# Patient Record
Sex: MALE | Race: WHITE | NOT HISPANIC OR LATINO | URBAN - METROPOLITAN AREA
[De-identification: names, ages, dates, MRNs, and addresses within clinical notes are randomized per-mention and may not be internally consistent; named-entity substitution may affect disease eponyms.]

---

## 2017-01-16 ENCOUNTER — EMERGENCY (EMERGENCY)
Facility: HOSPITAL | Age: 47
LOS: 1 days | Discharge: HOME | End: 2017-01-16
Attending: EMERGENCY MEDICINE | Admitting: EMERGENCY MEDICINE
Payer: OTHER MISCELLANEOUS

## 2017-01-16 VITALS
DIASTOLIC BLOOD PRESSURE: 89 MMHG | HEART RATE: 57 BPM | WEIGHT: 154.98 LBS | RESPIRATION RATE: 18 BRPM | TEMPERATURE: 98 F | SYSTOLIC BLOOD PRESSURE: 149 MMHG | OXYGEN SATURATION: 95 %

## 2017-01-16 VITALS
DIASTOLIC BLOOD PRESSURE: 84 MMHG | HEART RATE: 55 BPM | SYSTOLIC BLOOD PRESSURE: 125 MMHG | OXYGEN SATURATION: 97 % | TEMPERATURE: 98 F | RESPIRATION RATE: 17 BRPM

## 2017-01-16 DIAGNOSIS — S16.1XXA STRAIN OF MUSCLE, FASCIA AND TENDON AT NECK LEVEL, INITIAL ENCOUNTER: ICD-10-CM

## 2017-01-16 DIAGNOSIS — M54.5 LOW BACK PAIN: ICD-10-CM
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
## 2017-01-16 DIAGNOSIS — M25.511 PAIN IN RIGHT SHOULDER: ICD-10-CM

## 2017-01-16 DIAGNOSIS — S43.401A UNSPECIFIED SPRAIN OF RIGHT SHOULDER JOINT, INITIAL ENCOUNTER: ICD-10-CM

## 2017-01-16 PROCEDURE — 73030 X-RAY EXAM OF SHOULDER: CPT

## 2017-01-16 PROCEDURE — 72125 CT NECK SPINE W/O DYE: CPT

## 2017-01-16 PROCEDURE — 72100 X-RAY EXAM L-S SPINE 2/3 VWS: CPT | Mod: 26

## 2017-01-16 PROCEDURE — 99284 EMERGENCY DEPT VISIT MOD MDM: CPT | Mod: 25

## 2017-01-16 PROCEDURE — 99284 EMERGENCY DEPT VISIT MOD MDM: CPT

## 2017-01-16 PROCEDURE — 72100 X-RAY EXAM L-S SPINE 2/3 VWS: CPT

## 2017-01-16 PROCEDURE — 73030 X-RAY EXAM OF SHOULDER: CPT | Mod: 26,RT

## 2017-01-16 PROCEDURE — 72125 CT NECK SPINE W/O DYE: CPT | Mod: 26

## 2017-01-16 RX ORDER — CYCLOBENZAPRINE HYDROCHLORIDE 10 MG/1
1 TABLET, FILM COATED ORAL
Qty: 12 | Refills: 0
Start: 2017-01-16 | End: 2017-01-20

## 2017-01-16 RX ORDER — IBUPROFEN 200 MG
600 TABLET ORAL ONCE
Qty: 0 | Refills: 0 | Status: COMPLETED | OUTPATIENT
Start: 2017-01-16 | End: 2017-01-16

## 2017-01-16 RX ORDER — IBUPROFEN 200 MG
1 TABLET ORAL
Qty: 30 | Refills: 0
Start: 2017-01-16

## 2017-01-16 RX ORDER — CYCLOBENZAPRINE HYDROCHLORIDE 10 MG/1
10 TABLET, FILM COATED ORAL ONCE
Qty: 0 | Refills: 0 | Status: COMPLETED | OUTPATIENT
Start: 2017-01-16 | End: 2017-01-16

## 2017-01-16 RX ADMIN — Medication 600 MILLIGRAM(S): at 09:27

## 2017-01-16 NOTE — ED PROVIDER NOTE - NEUROLOGICAL, MLM
Alert and oriented, no focal deficits, no motor or sensory deficits except for decreased sensation to light touch ulnar right hand

## 2017-01-16 NOTE — ED ADULT NURSE NOTE - OBJECTIVE STATEMENT
Pt to ER states he "tripped and fell on the job," reports falling on face, no obvious snoted, pt denies LOC. Pt reports neck/back pain and tingling to upper extremities began immediately after fall. denies CP/SOB, no nausea/vomiting, no headache/dizziness, no abdominal pain. Patient able to move all extremities, no numbness/tingling to lower extremities noted. Remains in neck brace placed by EMS. await further medical evaluation safety precautions maintained, will continue to monitor.

## 2017-01-16 NOTE — ED PROVIDER NOTE - ENMT, MLM
Airway patent, Nasal mucosa clear. Mouth with normal mucosa.  Mid cervical midline tenderness and paraspinal tenderness more on right

## 2017-01-16 NOTE — ED ADULT TRIAGE NOTE - CHIEF COMPLAINT QUOTE
pateint s/p trip and fall from standing height. he states that he hit his face anf right arm. patient complains of neck stiffness with tingling to upper extremities. patient was placed in C-collar by EMS PTA

## 2017-01-16 NOTE — ED PROVIDER NOTE - DIAGNOSTIC INTERPRETATION
lumbar xray: no fracture or dislocation read by Dr. Borja  shoulder xray: no fracture or dislocation read by Dr. Borja

## 2017-01-16 NOTE — ED PROVIDER NOTE - MUSCULOSKELETAL, MLM
Spine appears normal, c spine tenderness mid cervical, tenderness right lateral shoulder with pain with attempted abduction of shoulder, left lumbar paraspinal tenderness

## 2017-01-16 NOTE — ED PROVIDER NOTE - MEDICAL DECISION MAKING DETAILS
mechanical fall.  xrays and neg for fracture.  pain improved with percocet/motrin/flexeril.  suspect strain/contusion.  plan shoulder sling, ortho followup, prn meds

## 2017-01-16 NOTE — ED PROVIDER NOTE - OBJECTIVE STATEMENT
here with pain in right shoulder, neck, left lower back after trip and fall.  Fell and hit upper back/neck on curb.  No loc.  Notes pins/needles sensation in arms and numbness in lateral right arm.  No chest pain, weakness, sob.

## 2017-01-16 NOTE — ED ADULT NURSE NOTE - ADDITIONAL PRINTED INSTRUCTIONS GIVEN
D/c w/ instructions for followup, pt verbalized understanding of all instructions, pt to followup w/ PMD/Ortho.

## 2019-09-04 ENCOUNTER — EMERGENCY (EMERGENCY)
Facility: HOSPITAL | Age: 49
LOS: 0 days | Discharge: HOME | End: 2019-09-04
Attending: EMERGENCY MEDICINE | Admitting: EMERGENCY MEDICINE
Payer: COMMERCIAL

## 2019-09-04 VITALS
OXYGEN SATURATION: 97 % | WEIGHT: 164.91 LBS | TEMPERATURE: 98 F | HEART RATE: 53 BPM | SYSTOLIC BLOOD PRESSURE: 132 MMHG | RESPIRATION RATE: 18 BRPM | DIASTOLIC BLOOD PRESSURE: 95 MMHG

## 2019-09-04 DIAGNOSIS — R07.9 CHEST PAIN, UNSPECIFIED: ICD-10-CM

## 2019-09-04 PROCEDURE — 93010 ELECTROCARDIOGRAM REPORT: CPT

## 2019-09-04 NOTE — ED PROVIDER NOTE - CLINICAL SUMMARY MEDICAL DECISION MAKING FREE TEXT BOX
48 yo male presented to triage complaining of CP. Triage RN did ekg. That ekg reviewed. Pt left ER prior to being seen by ER RN, PA or MD.

## 2019-09-26 ENCOUNTER — EMERGENCY (EMERGENCY)
Facility: HOSPITAL | Age: 49
LOS: 0 days | Discharge: HOME | End: 2019-09-26
Attending: EMERGENCY MEDICINE | Admitting: EMERGENCY MEDICINE
Payer: COMMERCIAL

## 2019-09-26 VITALS
HEART RATE: 61 BPM | SYSTOLIC BLOOD PRESSURE: 149 MMHG | DIASTOLIC BLOOD PRESSURE: 88 MMHG | RESPIRATION RATE: 19 BRPM | TEMPERATURE: 98 F | OXYGEN SATURATION: 97 %

## 2019-09-26 VITALS — HEIGHT: 65 IN | WEIGHT: 160.06 LBS

## 2019-09-26 DIAGNOSIS — R09.89 OTHER SPECIFIED SYMPTOMS AND SIGNS INVOLVING THE CIRCULATORY AND RESPIRATORY SYSTEMS: ICD-10-CM

## 2019-09-26 DIAGNOSIS — Z98.890 OTHER SPECIFIED POSTPROCEDURAL STATES: ICD-10-CM

## 2019-09-26 DIAGNOSIS — Z98.890 OTHER SPECIFIED POSTPROCEDURAL STATES: Chronic | ICD-10-CM

## 2019-09-26 DIAGNOSIS — R07.89 OTHER CHEST PAIN: ICD-10-CM

## 2019-09-26 LAB
ALBUMIN SERPL ELPH-MCNC: 4.1 G/DL — SIGNIFICANT CHANGE UP (ref 3.5–5.2)
ALP SERPL-CCNC: 86 U/L — SIGNIFICANT CHANGE UP (ref 30–115)
ALT FLD-CCNC: 36 U/L — SIGNIFICANT CHANGE UP (ref 0–41)
ANION GAP SERPL CALC-SCNC: 8 MMOL/L — SIGNIFICANT CHANGE UP (ref 7–14)
AST SERPL-CCNC: 27 U/L — SIGNIFICANT CHANGE UP (ref 0–41)
BILIRUB SERPL-MCNC: 0.3 MG/DL — SIGNIFICANT CHANGE UP (ref 0.2–1.2)
BUN SERPL-MCNC: 33 MG/DL — HIGH (ref 10–20)
CALCIUM SERPL-MCNC: 9 MG/DL — SIGNIFICANT CHANGE UP (ref 8.5–10.1)
CHLORIDE SERPL-SCNC: 104 MMOL/L — SIGNIFICANT CHANGE UP (ref 98–110)
CO2 SERPL-SCNC: 27 MMOL/L — SIGNIFICANT CHANGE UP (ref 17–32)
CREAT SERPL-MCNC: 1 MG/DL — SIGNIFICANT CHANGE UP (ref 0.7–1.5)
GLUCOSE SERPL-MCNC: 102 MG/DL — HIGH (ref 70–99)
HCT VFR BLD CALC: 43.9 % — SIGNIFICANT CHANGE UP (ref 42–52)
HGB BLD-MCNC: 14 G/DL — SIGNIFICANT CHANGE UP (ref 14–18)
LIDOCAIN IGE QN: 57 U/L — SIGNIFICANT CHANGE UP (ref 7–60)
MCHC RBC-ENTMCNC: 27 PG — SIGNIFICANT CHANGE UP (ref 27–31)
MCHC RBC-ENTMCNC: 31.9 G/DL — LOW (ref 32–37)
MCV RBC AUTO: 84.7 FL — SIGNIFICANT CHANGE UP (ref 80–94)
NRBC # BLD: 0 /100 WBCS — SIGNIFICANT CHANGE UP (ref 0–0)
PLATELET # BLD AUTO: 171 K/UL — SIGNIFICANT CHANGE UP (ref 130–400)
POTASSIUM SERPL-MCNC: 4.1 MMOL/L — SIGNIFICANT CHANGE UP (ref 3.5–5)
POTASSIUM SERPL-SCNC: 4.1 MMOL/L — SIGNIFICANT CHANGE UP (ref 3.5–5)
PROT SERPL-MCNC: 6.5 G/DL — SIGNIFICANT CHANGE UP (ref 6–8)
RBC # BLD: 5.18 M/UL — SIGNIFICANT CHANGE UP (ref 4.7–6.1)
RBC # FLD: 12.1 % — SIGNIFICANT CHANGE UP (ref 11.5–14.5)
SODIUM SERPL-SCNC: 139 MMOL/L — SIGNIFICANT CHANGE UP (ref 135–146)
TROPONIN T SERPL-MCNC: <0.01 NG/ML — SIGNIFICANT CHANGE UP
WBC # BLD: 4.67 K/UL — LOW (ref 4.8–10.8)
WBC # FLD AUTO: 4.67 K/UL — LOW (ref 4.8–10.8)

## 2019-09-26 PROCEDURE — 71046 X-RAY EXAM CHEST 2 VIEWS: CPT | Mod: 26

## 2019-09-26 PROCEDURE — 99285 EMERGENCY DEPT VISIT HI MDM: CPT

## 2019-09-26 RX ORDER — ALBUTEROL 90 UG/1
1 AEROSOL, METERED ORAL ONCE
Refills: 0 | Status: COMPLETED | OUTPATIENT
Start: 2019-09-26 | End: 2019-09-26

## 2019-09-26 RX ADMIN — ALBUTEROL 1 PUFF(S): 90 AEROSOL, METERED ORAL at 05:52

## 2019-09-26 NOTE — ED PROVIDER NOTE - CARE PROVIDER_API CALL
Roel Malone)  Cardiovascular Disease; Internal Medicine  [address] [place] [id number]  Phone: [phone]  Fax: [phone]  Follow Up Time:

## 2019-09-26 NOTE — ED PROVIDER NOTE - CLINICAL SUMMARY MEDICAL DECISION MAKING FREE TEXT BOX
pt pw  nasal/sinus congestion that he feels trackingto his chest -  low heart score , ekg no ischemi a cxr not wide no ptx , trop negative - Patient to be discharged from ED. Any available test results were discussed with and printed  for patient.  Verbal instructions given, including instructions to return to ED immediately for any new, worsening, or concerning symptoms. Patient reports understanding of above with capacity and insight. Written discharge instructions additionally given, including follow-up plan.

## 2019-09-26 NOTE — ED PROVIDER NOTE - OBJECTIVE STATEMENT
50 yo Male no sig hx present c/o throat and chest tightness started an hour ago woke him up from sleep. reported he has had seasonal allergy symptoms over the past week. Took Afrin and albuterol nebs which seems improved his symptoms. denies sharp chest pain/sob/diaphoresis/weakness assoc with the chest tightness. Denies exogenous hormone use/recent hospitalization/hx of DVT. denies recent illness/fever/chill/HA/dizziness/sob/abd pain/n/v/d/urinary sxs.

## 2019-09-26 NOTE — ED PROVIDER NOTE - NS ED ROS FT
Constitutional: no fever, chills, no recent weight loss, change in appetite or malaise  Eyes: no redness/discharge/pain/vision changes  ENT: + rhinorrhea no ear pain/sore throat  Cardiac: see HPI  Respiratory: No cough or respiratory distress  GI: No nausea, vomiting, diarrhea or abdominal pain.  : No dysuria, frequency, urgency or hematuria  MS: no pain to back or extremities, no loss of ROM, no weakness  Neuro: No headache or weakness. No LOC.  Skin: No skin rash.  Endocrine: No history of thyroid disease or diabetes.  Except as documented in the HPI, all other systems are negative.

## 2019-09-26 NOTE — ED PROVIDER NOTE - PHYSICAL EXAMINATION
CONSTITUTIONAL: Well-appearing; well-nourished; in no apparent distress.   EYES: PERRL; EOM intact.   CARDIOVASCULAR: Normal S1, S2; no murmurs, rubs, or gallops.   RESPIRATORY: Normal chest excursion with respiration; breath sounds clear and equal bilaterally; no wheezes, rhonchi, or rales.  GI/: Normal bowel sounds; non-distended; non-tender; no palpable organomegaly.   MS: No calf swelling and tenderness.  SKIN: Normal for age and race; warm; dry; good turgor; no apparent lesions or exudate.   NEURO/PSYCH: A & O x 4; grossly unremarkable.

## 2019-09-26 NOTE — ED PROVIDER NOTE - PATIENT PORTAL LINK FT
You can access the FollowMyHealth Patient Portal offered by [facility] by registering at the following website: [email or web address]. By joining [facility]’s FollowMyHealth portal, you will also be able to view your health information using other applications (apps) compatible with our system.

## 2020-09-09 ENCOUNTER — INPATIENT (INPATIENT)
Facility: HOSPITAL | Age: 50
LOS: 5 days | Discharge: HOME HEALTH | End: 2020-09-15
Attending: THORACIC SURGERY (CARDIOTHORACIC VASCULAR SURGERY) | Admitting: THORACIC SURGERY (CARDIOTHORACIC VASCULAR SURGERY)
Payer: COMMERCIAL

## 2020-09-09 VITALS
OXYGEN SATURATION: 96 % | HEART RATE: 60 BPM | SYSTOLIC BLOOD PRESSURE: 180 MMHG | TEMPERATURE: 98 F | DIASTOLIC BLOOD PRESSURE: 102 MMHG | RESPIRATION RATE: 18 BRPM

## 2020-09-09 DIAGNOSIS — Z98.890 OTHER SPECIFIED POSTPROCEDURAL STATES: Chronic | ICD-10-CM

## 2020-09-09 DIAGNOSIS — I21.4 NON-ST ELEVATION (NSTEMI) MYOCARDIAL INFARCTION: ICD-10-CM

## 2020-09-09 DIAGNOSIS — Z87.891 PERSONAL HISTORY OF NICOTINE DEPENDENCE: ICD-10-CM

## 2020-09-09 DIAGNOSIS — I10 ESSENTIAL (PRIMARY) HYPERTENSION: ICD-10-CM

## 2020-09-09 DIAGNOSIS — I25.10 ATHEROSCLEROTIC HEART DISEASE OF NATIVE CORONARY ARTERY WITHOUT ANGINA PECTORIS: ICD-10-CM

## 2020-09-09 DIAGNOSIS — Z82.49 FAMILY HISTORY OF ISCHEMIC HEART DISEASE AND OTHER DISEASES OF THE CIRCULATORY SYSTEM: ICD-10-CM

## 2020-09-09 DIAGNOSIS — R73.9 HYPERGLYCEMIA, UNSPECIFIED: ICD-10-CM

## 2020-09-09 DIAGNOSIS — J93.9 PNEUMOTHORAX, UNSPECIFIED: ICD-10-CM

## 2020-09-09 DIAGNOSIS — R14.0 ABDOMINAL DISTENSION (GASEOUS): ICD-10-CM

## 2020-09-09 DIAGNOSIS — R09.02 HYPOXEMIA: ICD-10-CM

## 2020-09-09 DIAGNOSIS — G89.12 ACUTE POST-THORACOTOMY PAIN: ICD-10-CM

## 2020-09-09 DIAGNOSIS — D62 ACUTE POSTHEMORRHAGIC ANEMIA: ICD-10-CM

## 2020-09-09 LAB
ALBUMIN SERPL ELPH-MCNC: 4.6 G/DL — SIGNIFICANT CHANGE UP (ref 3.5–5.2)
ALP SERPL-CCNC: 86 U/L — SIGNIFICANT CHANGE UP (ref 30–115)
ALT FLD-CCNC: 30 U/L — SIGNIFICANT CHANGE UP (ref 0–41)
ANION GAP SERPL CALC-SCNC: 11 MMOL/L — SIGNIFICANT CHANGE UP (ref 7–14)
APTT BLD: 28.1 SEC — SIGNIFICANT CHANGE UP (ref 27–39.2)
AST SERPL-CCNC: 25 U/L — SIGNIFICANT CHANGE UP (ref 0–41)
BASOPHILS # BLD AUTO: 0.04 K/UL — SIGNIFICANT CHANGE UP (ref 0–0.2)
BASOPHILS NFR BLD AUTO: 0.8 % — SIGNIFICANT CHANGE UP (ref 0–1)
BILIRUB SERPL-MCNC: 0.2 MG/DL — SIGNIFICANT CHANGE UP (ref 0.2–1.2)
BUN SERPL-MCNC: 35 MG/DL — HIGH (ref 10–20)
CALCIUM SERPL-MCNC: 9.3 MG/DL — SIGNIFICANT CHANGE UP (ref 8.5–10.1)
CHLORIDE SERPL-SCNC: 102 MMOL/L — SIGNIFICANT CHANGE UP (ref 98–110)
CO2 SERPL-SCNC: 25 MMOL/L — SIGNIFICANT CHANGE UP (ref 17–32)
CREAT SERPL-MCNC: 1.1 MG/DL — SIGNIFICANT CHANGE UP (ref 0.7–1.5)
EOSINOPHIL # BLD AUTO: 0.12 K/UL — SIGNIFICANT CHANGE UP (ref 0–0.7)
EOSINOPHIL NFR BLD AUTO: 2.5 % — SIGNIFICANT CHANGE UP (ref 0–8)
GLUCOSE BLDC GLUCOMTR-MCNC: 90 MG/DL — SIGNIFICANT CHANGE UP (ref 70–99)
GLUCOSE SERPL-MCNC: 108 MG/DL — HIGH (ref 70–99)
HCT VFR BLD CALC: 47.3 % — SIGNIFICANT CHANGE UP (ref 42–52)
HGB BLD-MCNC: 15 G/DL — SIGNIFICANT CHANGE UP (ref 14–18)
IMM GRANULOCYTES NFR BLD AUTO: 0.4 % — HIGH (ref 0.1–0.3)
INR BLD: 1.08 RATIO — SIGNIFICANT CHANGE UP (ref 0.65–1.3)
LYMPHOCYTES # BLD AUTO: 1.78 K/UL — SIGNIFICANT CHANGE UP (ref 1.2–3.4)
LYMPHOCYTES # BLD AUTO: 36.4 % — SIGNIFICANT CHANGE UP (ref 20.5–51.1)
MAGNESIUM SERPL-MCNC: 1.9 MG/DL — SIGNIFICANT CHANGE UP (ref 1.8–2.4)
MCHC RBC-ENTMCNC: 27.4 PG — SIGNIFICANT CHANGE UP (ref 27–31)
MCHC RBC-ENTMCNC: 31.7 G/DL — LOW (ref 32–37)
MCV RBC AUTO: 86.5 FL — SIGNIFICANT CHANGE UP (ref 80–94)
MONOCYTES # BLD AUTO: 0.68 K/UL — HIGH (ref 0.1–0.6)
MONOCYTES NFR BLD AUTO: 13.9 % — HIGH (ref 1.7–9.3)
NEUTROPHILS # BLD AUTO: 2.25 K/UL — SIGNIFICANT CHANGE UP (ref 1.4–6.5)
NEUTROPHILS NFR BLD AUTO: 46 % — SIGNIFICANT CHANGE UP (ref 42.2–75.2)
NRBC # BLD: 0 /100 WBCS — SIGNIFICANT CHANGE UP (ref 0–0)
NT-PROBNP SERPL-SCNC: 47 PG/ML — SIGNIFICANT CHANGE UP (ref 0–300)
PLATELET # BLD AUTO: 168 K/UL — SIGNIFICANT CHANGE UP (ref 130–400)
POTASSIUM SERPL-MCNC: 4.4 MMOL/L — SIGNIFICANT CHANGE UP (ref 3.5–5)
POTASSIUM SERPL-SCNC: 4.4 MMOL/L — SIGNIFICANT CHANGE UP (ref 3.5–5)
PROT SERPL-MCNC: 7 G/DL — SIGNIFICANT CHANGE UP (ref 6–8)
PROTHROM AB SERPL-ACNC: 12.4 SEC — SIGNIFICANT CHANGE UP (ref 9.95–12.87)
RAPID RVP RESULT: SIGNIFICANT CHANGE UP
RBC # BLD: 5.47 M/UL — SIGNIFICANT CHANGE UP (ref 4.7–6.1)
RBC # FLD: 12.3 % — SIGNIFICANT CHANGE UP (ref 11.5–14.5)
SARS-COV-2 RNA SPEC QL NAA+PROBE: SIGNIFICANT CHANGE UP
SODIUM SERPL-SCNC: 138 MMOL/L — SIGNIFICANT CHANGE UP (ref 135–146)
TROPONIN T SERPL-MCNC: <0.01 NG/ML — SIGNIFICANT CHANGE UP
WBC # BLD: 4.89 K/UL — SIGNIFICANT CHANGE UP (ref 4.8–10.8)
WBC # FLD AUTO: 4.89 K/UL — SIGNIFICANT CHANGE UP (ref 4.8–10.8)

## 2020-09-09 PROCEDURE — 93308 TTE F-UP OR LMTD: CPT | Mod: 26

## 2020-09-09 PROCEDURE — 99291 CRITICAL CARE FIRST HOUR: CPT | Mod: 25

## 2020-09-09 PROCEDURE — 93458 L HRT ARTERY/VENTRICLE ANGIO: CPT | Mod: 26

## 2020-09-09 PROCEDURE — 71045 X-RAY EXAM CHEST 1 VIEW: CPT | Mod: 26

## 2020-09-09 PROCEDURE — 93010 ELECTROCARDIOGRAM REPORT: CPT

## 2020-09-09 PROCEDURE — 99222 1ST HOSP IP/OBS MODERATE 55: CPT | Mod: 57

## 2020-09-09 RX ORDER — HEPARIN SODIUM 5000 [USP'U]/ML
INJECTION INTRAVENOUS; SUBCUTANEOUS
Qty: 25000 | Refills: 0 | Status: DISCONTINUED | OUTPATIENT
Start: 2020-09-09 | End: 2020-09-09

## 2020-09-09 RX ORDER — SODIUM CHLORIDE 9 MG/ML
1000 INJECTION INTRAMUSCULAR; INTRAVENOUS; SUBCUTANEOUS
Refills: 0 | Status: DISCONTINUED | OUTPATIENT
Start: 2020-09-09 | End: 2020-09-11

## 2020-09-09 RX ORDER — HEPARIN SODIUM 5000 [USP'U]/ML
800 INJECTION INTRAVENOUS; SUBCUTANEOUS
Qty: 25000 | Refills: 0 | Status: DISCONTINUED | OUTPATIENT
Start: 2020-09-09 | End: 2020-09-11

## 2020-09-09 RX ORDER — NITROGLYCERIN 6.5 MG
10 CAPSULE, EXTENDED RELEASE ORAL
Qty: 50 | Refills: 0 | Status: DISCONTINUED | OUTPATIENT
Start: 2020-09-09 | End: 2020-09-11

## 2020-09-09 RX ORDER — CHLORHEXIDINE GLUCONATE 213 G/1000ML
1 SOLUTION TOPICAL
Refills: 0 | Status: DISCONTINUED | OUTPATIENT
Start: 2020-09-09 | End: 2020-09-11

## 2020-09-09 RX ORDER — NITROGLYCERIN 6.5 MG
0.4 CAPSULE, EXTENDED RELEASE ORAL ONCE
Refills: 0 | Status: COMPLETED | OUTPATIENT
Start: 2020-09-09 | End: 2020-09-09

## 2020-09-09 RX ORDER — INFLUENZA VIRUS VACCINE 15; 15; 15; 15 UG/.5ML; UG/.5ML; UG/.5ML; UG/.5ML
0.5 SUSPENSION INTRAMUSCULAR ONCE
Refills: 0 | Status: DISCONTINUED | OUTPATIENT
Start: 2020-09-09 | End: 2020-09-11

## 2020-09-09 RX ORDER — ASPIRIN/CALCIUM CARB/MAGNESIUM 324 MG
81 TABLET ORAL DAILY
Refills: 0 | Status: DISCONTINUED | OUTPATIENT
Start: 2020-09-09 | End: 2020-09-11

## 2020-09-09 RX ADMIN — SODIUM CHLORIDE 100 MILLILITER(S): 9 INJECTION INTRAMUSCULAR; INTRAVENOUS; SUBCUTANEOUS at 22:30

## 2020-09-09 RX ADMIN — Medication 81 MILLIGRAM(S): at 18:26

## 2020-09-09 RX ADMIN — Medication 0.4 MILLIGRAM(S): at 18:50

## 2020-09-09 NOTE — CHART NOTE - NSCHARTNOTEFT_GEN_A_CORE
PRE-OP DIAGNOSIS: NSTEMI    PROCEDURE: [facility] with coronary angiography    Physician: Dr German  Assistant: Dr. Gaston Anthony    ANESTHESIA TYPE:  [  ]General Anesthesia  [ x ] Sedation  [ x ] Local/Regional    ESTIMATED BLOOD LOSS:    10   mL    CONDITION  [  ] Critical  [  ] Serious  [  ]Fair  [ x ]Good    IV CONTRAST:    100         mL    FINDINGS  Left Heart Catheterization:  LVEDP: 18    [x ]  significant 3-vessel coronary artery disease     ACCESS:    [x ] right radial artery  [ ] right femoral artery    LEFT HEART CATHETERIZATION                                    The coronary circulation is right dominant.   Left main: minor luminal irregularities  LAD: diffuse 90 % stenosis prox lad.   D1: diffuse 90 % stenosis at the ostium.  diffuse 99 % stenosis in the proximal third of the vessel segment - likely culprit for the patient's clinical presentation.   Circumflex: 60 % stenosis at the ostium. 80 % stenosis distal lcx.   OM1: 90 % stenosis at the ostium  OM3: 50 % stenosis in the proximal third  RCA: moderate diffuse atherosclerosis   RPDA: 90 % stenosis at the ostium   Right posterolateral segment: moderate diffuse atherosclerosis      INTERVENTION  IMPLANTS: none    POST-OP DIAGNOSIS  SIGNIFICANT 3VD        PLAN OF CARE  - admit to CCU --> txf to CTU once ct sx reviews/approves  - start on asa, lipitor 80  - start on hep gtt without bolus  - check cbc, cmp, lipid panel, hg a1c   - ekg upon arrival to floor and in am  - if more pain overnight,  if unstable, contact cardio fellow immediately,  may need urgent CABG by CT sx (Dr. Salgado) PRE-OP DIAGNOSIS: NSTEMI    PROCEDURE: [facility] with coronary angiography    Physician: Dr German  Assistant: Dr. Gaston Anthony    ANESTHESIA TYPE:  [  ]General Anesthesia  [ x ] Sedation  [ x ] Local/Regional    ESTIMATED BLOOD LOSS:    10   mL    CONDITION  [  ] Critical  [  ] Serious  [  ]Fair  [ x ]Good    IV CONTRAST:    100         mL    FINDINGS  Left Heart Catheterization:  LVEDP: 18    [x ]  significant 3-vessel coronary artery disease     ACCESS:    [x ] right radial artery  [ ] right femoral artery    LEFT HEART CATHETERIZATION                                    The coronary circulation is right dominant.   Left main: minor luminal irregularities  LAD: diffuse 90 % stenosis prox lad.   D1: diffuse 90 % stenosis at the ostium.  diffuse 99 % stenosis in the proximal third of the vessel segment - likely culprit for the patient's clinical presentation.   Circumflex: 60 % stenosis at the ostium. 80 % stenosis distal lcx.   OM1: 90 % stenosis at the ostium  OM3: 50 % stenosis in the proximal third  RCA: moderate diffuse atherosclerosis   RPDA: 90 % stenosis at the ostium   Right posterolateral segment: moderate diffuse atherosclerosis      INTERVENTION  IMPLANTS: none    POST-OP DIAGNOSIS  SIGNIFICANT 3VD        PLAN OF CARE  - admit to CCU --> txf to CTU once ct sx reviews/approves  - start on asa, lipitor 80  - start on hep gtt without bolus  - nitro gtt, start at 10, titrate to cp free  - check cbc, cmp, lipid panel, hg a1c   - ekg upon arrival to floor and in am  - if more pain overnight,  if unstable, contact cardio fellow immediately,  may need urgent CABG by CT sx (Dr. Salgado) PRE-OP DIAGNOSIS: NSTEMI    PROCEDURE: [facility] with coronary angiography    Physician: Dr German  Assistant: Dr. Gaston Anthony    ANESTHESIA TYPE:  [  ]General Anesthesia  [ x ] Sedation  [ x ] Local/Regional    ESTIMATED BLOOD LOSS:    10   mL    CONDITION  [  ] Critical  [  ] Serious  [  ]Fair  [ x ]Good    IV CONTRAST:    100         mL    FINDINGS  Left Heart Catheterization:  LVEDP: 18    [x ]  significant 3-vessel coronary artery disease     ACCESS:    [x ] right radial artery  [ ] right femoral artery    LEFT HEART CATHETERIZATION                                    The coronary circulation is right dominant.   Left main: minor luminal irregularities  LAD: diffuse 90 % stenosis prox lad.   D1: diffuse 90 % stenosis at the ostium.  diffuse 99 % stenosis in the proximal third of the vessel segment - likely culprit for the patient's clinical presentation.   Circumflex: 60 % stenosis at the ostium. 80 % stenosis distal lcx.   OM1: 90 % stenosis at the ostium  OM3: 50 % stenosis in the proximal third  RCA: moderate diffuse atherosclerosis   RPDA: 90 % stenosis at the ostium   Right posterolateral segment: moderate diffuse atherosclerosis      INTERVENTION  IMPLANTS: none    POST-OP DIAGNOSIS  SIGNIFICANT 3VD        PLAN OF CARE  - admit to CCU --> txf to CTU once ct sx reviews/approves  - start on asa, lipitor 80  - start on hep gtt without bolus  - nitro gtt, start at 10, titrate to cp free  - check cbc, cmp, lipid panel, hg a1c   - ekg upon arrival to floor and in am  - if more pain overnight,  if unstable, contact cardio fellow immediately,  may need urgent CABG by CT sx

## 2020-09-09 NOTE — PATIENT PROFILE ADULT - NSPROEDALEARNPREF_GEN_A_NUR
written material/verbal instruction/video/audio/group instruction/computer/internet/skill demonstration/individual instruction

## 2020-09-09 NOTE — ED ADULT NURSE REASSESSMENT NOTE - NS ED NURSE REASSESS COMMENT FT1
pt transferred to cardiac cath lab, accompanied by RN and cardiology team. continuous cardiac monitoring in place

## 2020-09-09 NOTE — ED PROVIDER NOTE - NSFOLLOWUPINSTRUCTIONS_ED_ALL_ED_FT
Last seen: 12/05/2018  pt  Last filled:11/26/2018 Fentanyl Patch  Upcoming apt: 04/29/2019       50M p/w chest pain. persistent. concerning ekg for diffuse depressions. repeat ekg with no change. given asa/ntg.

## 2020-09-09 NOTE — ED PROVIDER NOTE - PROGRESS NOTE DETAILS
Cardiology fellow called, will come evaluate patient in ED STAT cardio fellow at bedside- bedside cardiac us- no effusion. good squeeze - unable to asses lateral wall SC: PT with ST depressions in anterior leads, consistent with posterior MI. PT cardiology to take patient to cath lab. Patient to be admitted to a coronary critical care unit.  Case discussed with on-call cardiologist. Care endorsed to ICU resident.

## 2020-09-09 NOTE — ED PROVIDER NOTE - PHYSICAL EXAMINATION
CONSTITUTIONAL: Well-appearing; well-nourished; in mild distress due to chest pain  HEAD: Normocephalic; atraumatic.   EYES: PERRL; EOM intact. Conjunctiva normal B/L.   NECK: no jvd  CHEST: Normal chest excursion with respiration.   CARDIOVASCULAR: Normal S1, S2; no murmurs, rubs, or gallops.   RESPIRATORY: Normal chest excursion with respiration; breath sounds clear and equal bilaterally; no wheezes, rhonchi, or rales.  GI/: Normal bowel sounds; non-distended; non-tender.  EXT: warm, no edema. 2+ distal pulses throughout   SKIN: Normal for age and race; warm; dry; good turgor.  NEURO: A & O x 3; CN 2-12 intact. Grossly unremarkable.

## 2020-09-09 NOTE — ED ADULT NURSE NOTE - OBJECTIVE STATEMENT
Pt. came to ED c/o chest pain 5/10 in severity that radiates to left arm that started 1 hour ago. Denies sob, n/v/d, chills, or fever.

## 2020-09-09 NOTE — ED PROVIDER NOTE - OBJECTIVE STATEMENT
Patient is a 49 y/o male w/ no known pmhx, strong family hx of heart disease, who presents complaining of chest pain. Patient was exercising on treadmill when he developed sudden onset chest pressure/burning radiating to his left arm. Stopped exercising and chest pain persisted so came to ED. Onset of chest pain 1 hour prior to ED presentation. No hx of chest pain on exertion prior to today. Denies shortness of breath, syncope, abdominal pain, nausea/vomiting, swelling of legs, or any other complaints.

## 2020-09-09 NOTE — ED ADULT NURSE NOTE - NSIMPLEMENTINTERV_GEN_ALL_ED
Implemented All Universal Safety Interventions:  [place] to call system. Call bell, personal items and telephone within reach. Instruct patient to call for assistance. Room bathroom lighting operational. Non-slip footwear when patient is off stretcher. Physically safe environment: no spills, clutter or unnecessary equipment. Stretcher in lowest position, wheels locked, appropriate side rails in place.

## 2020-09-09 NOTE — CONSULT NOTE ADULT - ASSESSMENT
IMPRESSION  - NSTE-ACS  (s/p cath:  significant dz LAD, D1, LCX, OM1,RPDA)  - Significant 3V CAD    PLAN  - admit to CCU --> txf to CTU once ct sx reviews/approves  - start on asa, lipitor 80  - start on hep gtt without bolus  - check cbc, cmp, lipid panel, hg a1c   - ekg upon arrival to floor and in am  - if more pain overnight,  if unstable, contact cardio fellow immediately,  may need urgent CABG by CT sx (Dr. Salgado). IMPRESSION  - NSTE-ACS  (s/p cath:  significant dz LAD, D1, LCX, OM1,RPDA)  - Significant 3V CAD    PLAN  - admit to CCU --> txf to CTU once ct sx reviews/approves  - start on asa, lipitor 80  - start on hep gtt without bolus  - check cbc, cmp, lipid panel, hg a1c   - ekg upon arrival to floor and in am  - check tte  - if more pain overnight,  if unstable, contact cardio fellow immediately,  may need urgent CABG by CT sx (Dr. Salgado).

## 2020-09-09 NOTE — ED PROVIDER NOTE - CRITICAL CARE PROVIDED
consult w/ pt's family directly relating to pts condition/additional history taking/documentation/direct patient care (not related to procedure)/consultation with other physicians

## 2020-09-09 NOTE — CONSULT NOTE ADULT - SUBJECTIVE AND OBJECTIVE BOX
HPI:  51 yo M,  no sig pmh,  FH of early CAD in mother (60s), father (age?), nonsmoker --  presents with L chest pain radiating down to L arm.   Started while working out ~1.5h ptp, ongoing pains,  a/w nausea, sob.      Never had pains like this in past.  Was able to work out without cp previously.      EKG showing evolving ischemic changes concerning for possible posterior MI.  Pt taken urgently to cath due to evolving ekg changes and persistent pains.     Cath showed signfiicant 3VD (see cath report for full details),  and CT surgery consulted re: CABG.      PAST MEDICAL & SURGICAL HISTORY  No pertinent past medical history  H/O shoulder surgery        FAMILY HISTORY:  FAMILY HISTORY:  Family history of coronary artery disease (Mother)      SOCIAL HISTORY:  []smoker - denied  []Alcohol  []Drug    ALLERGIES:  No Known Allergies      MEDICATIONS:  aspirin  chewable 81 milliGRAM(s) Oral daily  heparin  Infusion.  Unit(s)/Hr (8 mL/Hr) IV Continuous <Continuous>  nitroglycerin  Infusion 10 MICROgram(s)/Min (3 mL/Hr) IV Continuous <Continuous>  sodium chloride 0.9%. 1000 milliLiter(s) (100 mL/Hr) IV Continuous <Continuous>      HOME MEDICATIONS:  Home Medications:  none    VITALS:   T(F): 98.4 (09-09 @ 21:06), Max: 98.5 (09-09 @ 18:10)  HR: 64 (09-09 @ 22:00) (60 - 72)  BP: 115/70 (09-09 @ 21:45) (106/57 - 180/102)  BP(mean): 81 (09-09 @ 22:00) (81 - 92)  RR: 18 (09-09 @ 22:00) (13 - 20)  SpO2: 98% (09-09 @ 22:00) (96% - 99%)    I&O's Summary    09 Sep 2020 07:01  -  09 Sep 2020 22:10  --------------------------------------------------------  IN: 0 mL / OUT: 400 mL / NET: -400 mL        REVIEW OF SYSTEMS:  CONSTITUTIONAL: No weakness, fevers or chills  HEENT: No visual changes, neck/ear pain  RESPIRATORY: No cough, +sob  CARDIOVASCULAR: +CP  GASTROINTESTINAL: No abdominal pain. +nausea  GENITOURINARY: No dysuria, frequency or hematuria  NEUROLOGICAL: No new focal deficits  SKIN: No new rashes    PHYSICAL EXAM:  General: Not in distress.  Non-toxic appearing.   HEENT: EOMI  Cardio: Regular rate and rhythm, S1, S2, no murmur  Pulm: B/L BS.  No wheezing / crackles  Abdomen: Soft, non-tender, non-distended. Normoactive bowel sounds  Extremities: No edema b/l   Neuro: A&O x3. No focal deficits    LABS:                        15.0   4.89  )-----------( 168      ( 09 Sep 2020 18:20 )             47.3     09-09    138  |  102  |  35<H>  ----------------------------<  108<H>  4.4   |  25  |  1.1    Ca    9.3      09 Sep 2020 18:20  Mg     1.9     09-09    TPro  7.0  /  Alb  4.6  /  TBili  0.2  /  DBili  x   /  AST  25  /  ALT  30  /  AlkPhos  86  09-09    PT/INR - ( 09 Sep 2020 18:20 )   PT: 12.40 sec;   INR: 1.08 ratio         PTT - ( 09 Sep 2020 18:20 )  PTT:28.1 sec  Troponin T, Serum: <0.01 ng/mL (09-09-20 @ 18:20)    CARDIAC MARKERS ( 09 Sep 2020 18:20 )  x     / <0.01 ng/mL / x     / x     / x            Troponin trend:    Serum Pro-Brain Natriuretic Peptide: 47 pg/mL (09-09-20 @ 18:20)          RADIOLOGY:  -CXR: --  -TTE:--  -CCTA:--  -STRESS TEST:--  -CATHETERIZATION: --    ECG:  evolving changes and st depressions in inferior / anterolat leads concerning for possible posterior MI    TELEMETRY EVENTS:

## 2020-09-09 NOTE — ED PROVIDER NOTE - CLINICAL SUMMARY MEDICAL DECISION MAKING FREE TEXT BOX
50M p/w chest pain. persistent. concerning ekg for diffuse depressions. repeat ekg with no change. given asa/ntg. u/s with non uniform contractility possible post wall. Labs reviewed by my, values noted to be within normal limits. cardio consulted-  at bedside- taken to cath lab.

## 2020-09-10 LAB
A1C WITH ESTIMATED AVERAGE GLUCOSE RESULT: 5.1 % — SIGNIFICANT CHANGE UP (ref 4–5.6)
ALBUMIN SERPL ELPH-MCNC: 3.6 G/DL — SIGNIFICANT CHANGE UP (ref 3.5–5.2)
ALP SERPL-CCNC: 78 U/L — SIGNIFICANT CHANGE UP (ref 30–115)
ALT FLD-CCNC: 31 U/L — SIGNIFICANT CHANGE UP (ref 0–41)
ANION GAP SERPL CALC-SCNC: 9 MMOL/L — SIGNIFICANT CHANGE UP (ref 7–14)
APPEARANCE UR: CLEAR — SIGNIFICANT CHANGE UP
APTT BLD: 38.7 SEC — SIGNIFICANT CHANGE UP (ref 27–39.2)
APTT BLD: 40 SEC — HIGH (ref 27–39.2)
APTT BLD: 46.7 SEC — HIGH (ref 27–39.2)
AST SERPL-CCNC: 84 U/L — HIGH (ref 0–41)
BACTERIA # UR AUTO: NEGATIVE — SIGNIFICANT CHANGE UP
BASOPHILS # BLD AUTO: 0.05 K/UL — SIGNIFICANT CHANGE UP (ref 0–0.2)
BASOPHILS NFR BLD AUTO: 0.9 % — SIGNIFICANT CHANGE UP (ref 0–1)
BILIRUB SERPL-MCNC: 0.3 MG/DL — SIGNIFICANT CHANGE UP (ref 0.2–1.2)
BILIRUB UR-MCNC: NEGATIVE — SIGNIFICANT CHANGE UP
BUN SERPL-MCNC: 28 MG/DL — HIGH (ref 10–20)
CALCIUM SERPL-MCNC: 8.6 MG/DL — SIGNIFICANT CHANGE UP (ref 8.5–10.1)
CHLORIDE SERPL-SCNC: 106 MMOL/L — SIGNIFICANT CHANGE UP (ref 98–110)
CHOLEST SERPL-MCNC: 187 MG/DL — SIGNIFICANT CHANGE UP (ref 100–200)
CO2 SERPL-SCNC: 24 MMOL/L — SIGNIFICANT CHANGE UP (ref 17–32)
COLOR SPEC: SIGNIFICANT CHANGE UP
CREAT SERPL-MCNC: 1 MG/DL — SIGNIFICANT CHANGE UP (ref 0.7–1.5)
DIFF PNL FLD: ABNORMAL
EOSINOPHIL # BLD AUTO: 0.2 K/UL — SIGNIFICANT CHANGE UP (ref 0–0.7)
EOSINOPHIL NFR BLD AUTO: 3.5 % — SIGNIFICANT CHANGE UP (ref 0–8)
EPI CELLS # UR: 0 /HPF — SIGNIFICANT CHANGE UP (ref 0–5)
ESTIMATED AVERAGE GLUCOSE: 100 MG/DL — SIGNIFICANT CHANGE UP (ref 68–114)
GLUCOSE SERPL-MCNC: 119 MG/DL — HIGH (ref 70–99)
GLUCOSE UR QL: NEGATIVE — SIGNIFICANT CHANGE UP
HCT VFR BLD CALC: 41.8 % — LOW (ref 42–52)
HDLC SERPL-MCNC: 48 MG/DL — SIGNIFICANT CHANGE UP
HGB BLD-MCNC: 13.2 G/DL — LOW (ref 14–18)
HYALINE CASTS # UR AUTO: 0 /LPF — SIGNIFICANT CHANGE UP (ref 0–7)
KETONES UR-MCNC: NEGATIVE — SIGNIFICANT CHANGE UP
LEUKOCYTE ESTERASE UR-ACNC: NEGATIVE — SIGNIFICANT CHANGE UP
LIPID PNL WITH DIRECT LDL SERPL: 136 MG/DL — HIGH (ref 4–129)
LYMPHOCYTES # BLD AUTO: 1.33 K/UL — SIGNIFICANT CHANGE UP (ref 1.2–3.4)
LYMPHOCYTES # BLD AUTO: 23.7 % — SIGNIFICANT CHANGE UP (ref 20.5–51.1)
MAGNESIUM SERPL-MCNC: 1.9 MG/DL — SIGNIFICANT CHANGE UP (ref 1.8–2.4)
MANUAL SMEAR VERIFICATION: SIGNIFICANT CHANGE UP
MCHC RBC-ENTMCNC: 27.2 PG — SIGNIFICANT CHANGE UP (ref 27–31)
MCHC RBC-ENTMCNC: 31.6 G/DL — LOW (ref 32–37)
MCV RBC AUTO: 86 FL — SIGNIFICANT CHANGE UP (ref 80–94)
MONOCYTES # BLD AUTO: 0.89 K/UL — HIGH (ref 0.1–0.6)
MONOCYTES NFR BLD AUTO: 15.8 % — HIGH (ref 1.7–9.3)
MRSA PCR RESULT.: NEGATIVE — SIGNIFICANT CHANGE UP
NEUTROPHILS # BLD AUTO: 3.1 K/UL — SIGNIFICANT CHANGE UP (ref 1.4–6.5)
NEUTROPHILS NFR BLD AUTO: 55.2 % — SIGNIFICANT CHANGE UP (ref 42.2–75.2)
NITRITE UR-MCNC: NEGATIVE — SIGNIFICANT CHANGE UP
PH UR: 6.5 — SIGNIFICANT CHANGE UP (ref 5–8)
PLAT MORPH BLD: NORMAL — SIGNIFICANT CHANGE UP
PLATELET # BLD AUTO: 162 K/UL — SIGNIFICANT CHANGE UP (ref 130–400)
POIKILOCYTOSIS BLD QL AUTO: SIGNIFICANT CHANGE UP
POTASSIUM SERPL-MCNC: 3.9 MMOL/L — SIGNIFICANT CHANGE UP (ref 3.5–5)
POTASSIUM SERPL-SCNC: 3.9 MMOL/L — SIGNIFICANT CHANGE UP (ref 3.5–5)
PROT SERPL-MCNC: 5.7 G/DL — LOW (ref 6–8)
PROT UR-MCNC: NEGATIVE — SIGNIFICANT CHANGE UP
RBC # BLD: 4.86 M/UL — SIGNIFICANT CHANGE UP (ref 4.7–6.1)
RBC # FLD: 12.4 % — SIGNIFICANT CHANGE UP (ref 11.5–14.5)
RBC BLD AUTO: NORMAL — SIGNIFICANT CHANGE UP
RBC CASTS # UR COMP ASSIST: 11 /HPF — HIGH (ref 0–4)
SODIUM SERPL-SCNC: 139 MMOL/L — SIGNIFICANT CHANGE UP (ref 135–146)
SP GR SPEC: 1.01 — SIGNIFICANT CHANGE UP (ref 1.01–1.02)
TOTAL CHOLESTEROL/HDL RATIO MEASUREMENT: 3.9 RATIO — LOW (ref 4–5.5)
TRIGL SERPL-MCNC: 45 MG/DL — SIGNIFICANT CHANGE UP (ref 10–149)
TSH SERPL-MCNC: 2.64 UIU/ML — SIGNIFICANT CHANGE UP (ref 0.27–4.2)
UROBILINOGEN FLD QL: SIGNIFICANT CHANGE UP
VARIANT LYMPHS # BLD: 0.9 % — SIGNIFICANT CHANGE UP (ref 0–5)
WBC # BLD: 5.61 K/UL — SIGNIFICANT CHANGE UP (ref 4.8–10.8)
WBC # FLD AUTO: 5.61 K/UL — SIGNIFICANT CHANGE UP (ref 4.8–10.8)
WBC UR QL: 1 /HPF — SIGNIFICANT CHANGE UP (ref 0–5)

## 2020-09-10 PROCEDURE — 93306 TTE W/DOPPLER COMPLETE: CPT | Mod: 26

## 2020-09-10 PROCEDURE — 93880 EXTRACRANIAL BILAT STUDY: CPT | Mod: 26

## 2020-09-10 PROCEDURE — 99252 IP/OBS CONSLTJ NEW/EST SF 35: CPT | Mod: 57

## 2020-09-10 PROCEDURE — 99223 1ST HOSP IP/OBS HIGH 75: CPT

## 2020-09-10 PROCEDURE — 71250 CT THORAX DX C-: CPT | Mod: 26

## 2020-09-10 RX ORDER — ATORVASTATIN CALCIUM 80 MG/1
80 TABLET, FILM COATED ORAL AT BEDTIME
Refills: 0 | Status: DISCONTINUED | OUTPATIENT
Start: 2020-09-10 | End: 2020-09-11

## 2020-09-10 RX ORDER — CHLORHEXIDINE GLUCONATE 213 G/1000ML
1 SOLUTION TOPICAL ONCE
Refills: 0 | Status: COMPLETED | OUTPATIENT
Start: 2020-09-10 | End: 2020-09-10

## 2020-09-10 RX ORDER — MAGNESIUM SULFATE 500 MG/ML
1 VIAL (ML) INJECTION ONCE
Refills: 0 | Status: COMPLETED | OUTPATIENT
Start: 2020-09-10 | End: 2020-09-10

## 2020-09-10 RX ORDER — ALBUMIN HUMAN 25 %
2000 VIAL (ML) INTRAVENOUS ONCE
Refills: 0 | Status: DISCONTINUED | OUTPATIENT
Start: 2020-09-11 | End: 2020-09-11

## 2020-09-10 RX ORDER — SODIUM CHLORIDE 9 MG/ML
1000 INJECTION, SOLUTION INTRAVENOUS
Refills: 0 | Status: DISCONTINUED | OUTPATIENT
Start: 2020-09-10 | End: 2020-09-10

## 2020-09-10 RX ORDER — ALPRAZOLAM 0.25 MG
0.5 TABLET ORAL AT BEDTIME
Refills: 0 | Status: DISCONTINUED | OUTPATIENT
Start: 2020-09-10 | End: 2020-09-11

## 2020-09-10 RX ORDER — CHLORHEXIDINE GLUCONATE 213 G/1000ML
1 SOLUTION TOPICAL ONCE
Refills: 0 | Status: COMPLETED | OUTPATIENT
Start: 2020-09-11 | End: 2020-09-11

## 2020-09-10 RX ORDER — CHLORHEXIDINE GLUCONATE 213 G/1000ML
10 SOLUTION TOPICAL ONCE
Refills: 0 | Status: COMPLETED | OUTPATIENT
Start: 2020-09-10 | End: 2020-09-11

## 2020-09-10 RX ORDER — PANTOPRAZOLE SODIUM 20 MG/1
40 TABLET, DELAYED RELEASE ORAL
Refills: 0 | Status: DISCONTINUED | OUTPATIENT
Start: 2020-09-10 | End: 2020-09-11

## 2020-09-10 RX ORDER — METOPROLOL TARTRATE 50 MG
12.5 TABLET ORAL
Refills: 0 | Status: DISCONTINUED | OUTPATIENT
Start: 2020-09-10 | End: 2020-09-11

## 2020-09-10 RX ADMIN — Medication 12.5 MILLIGRAM(S): at 06:24

## 2020-09-10 RX ADMIN — Medication 12.5 MILLIGRAM(S): at 17:48

## 2020-09-10 RX ADMIN — CHLORHEXIDINE GLUCONATE 1 APPLICATION(S): 213 SOLUTION TOPICAL at 05:59

## 2020-09-10 RX ADMIN — HEPARIN SODIUM 9.5 UNIT(S)/HR: 5000 INJECTION INTRAVENOUS; SUBCUTANEOUS at 05:58

## 2020-09-10 RX ADMIN — PANTOPRAZOLE SODIUM 40 MILLIGRAM(S): 20 TABLET, DELAYED RELEASE ORAL at 11:55

## 2020-09-10 RX ADMIN — Medication 100 GRAM(S): at 05:57

## 2020-09-10 RX ADMIN — Medication 0.5 MILLIGRAM(S): at 22:51

## 2020-09-10 RX ADMIN — CHLORHEXIDINE GLUCONATE 1 APPLICATION(S): 213 SOLUTION TOPICAL at 22:20

## 2020-09-10 RX ADMIN — ATORVASTATIN CALCIUM 80 MILLIGRAM(S): 80 TABLET, FILM COATED ORAL at 21:27

## 2020-09-10 RX ADMIN — Medication 81 MILLIGRAM(S): at 11:55

## 2020-09-10 NOTE — H&P ADULT - NSICDXFAMILYHX_GEN_ALL_CORE_FT
FAMILY HISTORY:  Mother  Still living? Unknown  Family history of coronary artery disease, Age at diagnosis: 61-70

## 2020-09-10 NOTE — H&P ADULT - ASSESSMENT
Impression       Plan     CNS: Avoid CNS depressants.     CARDIOVASCULAR: Cardiology following, sp cath with significant CAD. Will need eval by CT surgery for CABG. Once accepted will be transferred to CTU service. Will medically optimize for surgery. Continuous cardiac monitoring. EKG daily. Follow-up Echo. High-dose statin, BB, ASA 81 , and heparin drip for now. Titrate nitro drip for chest pain.     PULMONARY: HOB >45 degrees. Keep SpO2 >95%    INFECTIOUS DISEASE: IV and catheter care PRN.     GI: Prophylaxis with Protonix 40mg PO daily.      RENAL: I = O. Replace electrolytes if needed. No Adams. LR at 100mL/hr    ENDOCRINE: FS before breakfast. Insulin protocol if needed. TSH and HbA1c.     HEMATOLOGY: DVT ppx with heparin IV, monitor ptt q6.     FULL CODE  Prognosis guarded   CTU for CABG when approved Impression       Plan     CNS: Avoid CNS depressants.     CARDIOVASCULAR: Cardiology following, sp cath with significant CAD. Will need eval by CT surgery for CABG. Once accepted will be transferred to CTU service. Will medically optimize for surgery. Continuous cardiac monitoring. EKG daily. Follow-up Echo. High-dose statin, BB, ASA 81 , and heparin drip for now. Titrate nitro drip for chest pain. Lipid panel.     PULMONARY: HOB >45 degrees. Keep SpO2 >95%    INFECTIOUS DISEASE: IV and catheter care PRN.     GI: Prophylaxis with Protonix 40mg PO daily.      RENAL: I = O. Replace electrolytes if needed. No Adams. NS at 100mL/hr for 6 hours    ENDOCRINE: FS before breakfast. Insulin protocol if needed. TSH and HbA1c.     HEMATOLOGY: DVT ppx with heparin IV, monitor ptt q6.     FULL CODE  Prognosis guarded   CTU for CABG when approved Impression   3-vessel CAD s/p PCI  Will need CABG    Plan     CNS: Avoid CNS depressants.     CARDIOVASCULAR: Cardiology following, sp cath with significant CAD. Will need eval by CT surgery for CABG. Once accepted will be transferred to CTU service. Will medically optimize for surgery. Continuous cardiac monitoring. EKG daily. Follow-up Echo. High-dose statin, BB, ASA 81 , and heparin drip for now. Titrate nitro drip for chest pain. Lipid panel.     PULMONARY: HOB >45 degrees. Keep SpO2 >95%    INFECTIOUS DISEASE: IV and catheter care PRN.     GI: Prophylaxis with Protonix 40mg PO daily.      RENAL: I = O. Replace electrolytes if needed. No Adams. NS at 100mL/hr for 6 hours    ENDOCRINE: FS before breakfast. Insulin protocol if needed. TSH and HbA1c.     HEMATOLOGY: DVT ppx with heparin IV, monitor ptt q6.     FULL CODE  Prognosis guarded   CTU for CABG when approved

## 2020-09-10 NOTE — PROGRESS NOTE ADULT - ASSESSMENT
50 y.o. male with no known PMH presents for chest pain x 1 hour    #STEMI  -Cath showed significant triple-vessel CAD with 90% stenosis of the ostium of the LAD with 99% stenosis in the proximal third of the vessel.   -Aspirin 81, Heparin, nitroglycerin drip, metoprolol 12.5 BID, atorvastatin 80.  -Nitro drip  -CT surgery evaluation for CABG and can transfer to CTU    DVT ppx: Heparin  GI ppx: Protonix  Diet: DASH 50 y.o. male with no known PMH presents for chest pain x 1 hour    #MI  -Cath showed significant triple-vessel CAD with 90% stenosis of the ostium of the LAD with 99% stenosis in the proximal third of the vessel.   -Aspirin 81, Heparin, nitroglycerin drip, metoprolol 12.5 BID, atorvastatin 80.  -Nitro drip  -CT surgery evaluation for CABG and can transfer to CTU    DVT ppx: Heparin  GI ppx: Protonix  Diet: DASH

## 2020-09-10 NOTE — PROGRESS NOTE ADULT - SUBJECTIVE AND OBJECTIVE BOX
DAILY PROGRESS NOTE  ===========================================================    Patient Information:  BERTO HEIN  /  50y  /  Male  /  MRN#: [id number]    Hospital Day: 1d     |:::::::::::::::::::::::::::| SUBJECTIVE |:::::::::::::::::::::::::::|    OVERNIGHT EVENTS:   TODAY: Patient was seen today at bedside. No overnight events. Review of systems is otherwise negative.    |:::::::::::::::::::::::::::| OBJECTIVE |:::::::::::::::::::::::::::|    VITAL SIGNS: Last 24 Hours  T(C): 36.6 (10 Sep 2020 08:00), Max: 36.9 (09 Sep 2020 18:10)  T(F): 97.8 (10 Sep 2020 08:00), Max: 98.5 (09 Sep 2020 18:10)  HR: 60 (10 Sep 2020 10:00) (52 - 74)  BP: 117/68 (10 Sep 2020 10:00) (104/61 - 180/102)  BP(mean): 87 (10 Sep 2020 10:00) (78 - 120)  RR: 16 (10 Sep 2020 08:00) (9 - 24)  SpO2: 98% (10 Sep 2020 10:00) (96% - 99%)    09-09-20 @ 07:01  -  09-10-20 @ 07:00  --------------------------------------------------------  IN: 1388 mL / OUT: 1100 mL / NET: 288 mL    09-10-20 @ 07:01  -  09-10-20 @ 10:44  --------------------------------------------------------  IN: 290 mL / OUT: 0 mL / NET: 290 mL      PHYSICAL EXAM:  GENERAL:   Awake, alert; NAD.  HEENT:  Head NC/AT; Conjunctivae pink, Sclera anicteric; Oral mucosa moist.  CARDIO:   Regular rate; Regular rhythm; S1 & S2.  RESP:   No rales, wheezing, or rhonchi appreciated.  GI:   Soft; NT/ND; BS; No guarding; No rebound tenderness.  EXT:   Stregnth UE 5/5; Strength LE 5/5; No edema.   SKIN:   Intact.    LAB RESULTS:                        13.2   5.61  )-----------( 162      ( 10 Sep 2020 04:22 )             41.8     09-10    139  |  106  |  28<H>  ----------------------------<  119<H>  3.9   |  24  |  1.0    Ca    8.6      10 Sep 2020 04:22  Mg     1.9     09-10    TPro  5.7<L>  /  Alb  3.6  /  TBili  0.3  /  DBili  x   /  AST  84<H>  /  ALT  31  /  AlkPhos  78  09-10    PT/INR - ( 09 Sep 2020 18:20 )   PT: 12.40 sec;   INR: 1.08 ratio         PTT - ( 10 Sep 2020 04:22 )  PTT:38.7 sec      Troponin T, Serum: <0.01 ng/mL (09-09-20 @ 18:20)    CARDIAC MARKERS ( 09 Sep 2020 18:20 )  x     / <0.01 ng/mL / x     / x     / x          MICROBIOLOGY:  none    RADIOLOGY:  Reviewed    ALLERGIES:  No Known Allergies      ===========================================================

## 2020-09-10 NOTE — H&P ADULT - ATTENDING COMMENTS
Patient seen and examined independently. Agree with resident note/ history / physical exam and plan of care with following exceptions/additions/updates. Case discussed with patient/pt decision maker, house-staff and nursing.     pt sp cath, significant 3v dis , plan for cabg in am   pt is pain free and hemodynamically stable at this time.   full code

## 2020-09-10 NOTE — CONSULT NOTE ADULT - ASSESSMENT
CTS Attending  Case reviewed and angiogram discussed with Dr. German  Patient with stron family history of CAD, now required CABG due to critical 3-v disease  Procedure explained to patient and wife, including risks, benefits and alternatives and they agreed to proceed with surgery tomorrow.  Keep anticoagulation until surgery  NPO after 12 mn    -FMR

## 2020-09-10 NOTE — PRE-ANESTHESIA EVALUATION ADULT - NSANTHOSAYNRD_GEN_A_CORE
No. OMAR screening performed.  STOP BANG Legend: 0-2 = LOW Risk; 3-4 = INTERMEDIATE Risk; 5-8 = HIGH Risk

## 2020-09-10 NOTE — H&P ADULT - NSHPLABSRESULTS_GEN_ALL_CORE
(09-09 @ 18:20)                      15.0  4.89 )-----------( 168                 47.3    Neutrophils = 2.25 (46.0%)  Lymphocytes = 1.78 (36.4%)  Eosinophils = 0.12 (2.5%)  Basophils = 0.04 (0.8%)  Monocytes = 0.68 (13.9%)  Bands = --%    09-09    138  |  102  |  35<H>  ----------------------------<  108<H>  4.4   |  25  |  1.1    Ca    9.3      09 Sep 2020 18:20  Mg     1.9     09-09    TPro  7.0  /  Alb  4.6  /  TBili  0.2  /  DBili  x   /  AST  25  /  ALT  30  /  AlkPhos  86  09-09    ( 09 Sep 2020 18:20 )   PT: 12.40 sec;   INR: 1.08 ratio;       PTT:28.1 sec  CARDIAC MARKERS ( 09 Sep 2020 18:20 )  Trop <0.01 ng/mL / CK x     / CKMB x           RVP:(09-09 @ 19:20)  NotDetec            Tox:

## 2020-09-10 NOTE — H&P ADULT - HISTORY OF PRESENT ILLNESS
Chief Complaint: Chest Pain       Past Medical History: Triple-vessel CAD      Past Surgical History: None relevant       History of present illness goes back to the day of presentation when the patient experienced the sudden onset of substernal chest pain. The pain is exertional, retrosternal, and radiates down the left arm. At the time of the onset of the pain, the patient was exercising on a treadmill. He stopped exercising and waited for the pain to subside. After an hour, the pain persisted so he decided to come to the ED. He has a strong family history of premature heart disease/CAD as well. He is a non-smoker.       In the ED, the patient was hypertensive to 180/102. EKG revealed diffuse ST-depressions in the anterior leads concerning for a posterior wall MI. Cardiology was called and took the patient emergently to the cath lab where it was discovered that he had significant triple-vessel CAD with 90% stenosis of the ostium of the LAD with 99% stenosis in the proximal third of the vessel, likely the culprit lesion. He was placed on a heparin drip and nitro drip for chest pain. Admitted to CCU for monitoring and will be transferred to CTU after evaluation for CABG.      ROS: Denies headache, changes in vision, chest pain, palpitations, shortness of breath, cough, fever, chills, nausea, vomiting, diarrhea, and constipation.

## 2020-09-10 NOTE — CONSULT NOTE ADULT - SUBJECTIVE AND OBJECTIVE BOX
Surgeon: /Shannon/Reginaldo    Consult requesting by: Dr. Maxi MOTTD: Camron    HISTORY OF PRESENT ILLNESS:  History of present illness goes back to the day of presentation when the patient experienced the sudden onset of substernal chest pain. The pain is exertional, retrosternal, and radiates down the left arm. At the time of the onset of the pain, the patient was exercising on a treadmill. He stopped exercising and waited for the pain to subside. After an hour, the pain persisted so he decided to come to the ED. He has a strong family history of premature heart disease/CAD as well. He is a non-smoker.   In the ED, the patient was hypertensive to 180/102. EKG revealed diffuse ST-depressions in the anterior leads concerning for a posterior wall MI. Cardiology was called and took the patient emergently to the cath lab where it was discovered that he had significant triple-vessel CAD with 90% stenosis of the ostium of the LAD with 99% stenosis in the proximal third of the vessel, likely the culprit lesion. He was placed on a heparin drip and nitro drip for chest pain. Admitted to CCU for monitoring and will be transferred to CTU after evaluation for CABG.    PAST MEDICAL & SURGICAL HISTORY:  No pertinent past medical history  H/O shoulder surgery    MEDICATIONS  (STANDING):  aspirin  chewable 81 milliGRAM(s) Oral daily  atorvastatin 80 milliGRAM(s) Oral at bedtime  chlorhexidine 4% Liquid 1 Application(s) Topical <User Schedule>  heparin  Infusion 800 Unit(s)/Hr (9.5 mL/Hr) IV Continuous <Continuous>  influenza   Vaccine 0.5 milliLiter(s) IntraMuscular once  metoprolol tartrate 12.5 milliGRAM(s) Oral two times a day  nitroglycerin  Infusion 10 MICROgram(s)/Min (3 mL/Hr) IV Continuous <Continuous>  pantoprazole    Tablet 40 milliGRAM(s) Oral before breakfast  sodium chloride 0.9%. 1000 milliLiter(s) (100 mL/Hr) IV Continuous <Continuous>    Allergies    No Known Allergies      SOCIAL HISTORY:  Smoker: [ ] Yes  [x ] No        PACK YEARS:                         WHEN QUIT?  ETOH use: [ ] Yes  [x ] No              FREQUENCY / QUANTITY:  Ilicit Drug use:  [ ] Yes  [ x] No  Occupation:  Lives with:  Assisted device use:  5 meter walk test: 1____sec, 2____sec, 3___sec  FAMILY HISTORY:  Family history of coronary artery disease (Mother)      Review of Systems  CONSTITUTIONAL:  Fevers[ ] chills[ ] sweats[ ] fatigue[ ] weight loss[ ] weight gain [ ]            NEGATIVE [X ]   NEURO:  parathesias[ ] seizures [ ]  syncope [ ]  confusion [ ]                                                       NEGATIVE[ X]   EYES: glasses[ ]  blurry vision[ ]  discharge[ ] pain[ ] glaucoma [ ]                                                 NEGATIVE[X ]   ENMT:  difficulty hearing [ ]  vertigo[ ]  dysphagia[ ] epistaxis[ ] recent dental work [ ]           NEGATIVE[ X]   CV:  chest pain[ ] palpitations[ ] HESS [ ] diaphoresis [ ]                                                                  NEGATIVE[ X]   RESPIRATORY:  wheezing[ ] SOB[ ] cough [ ] sputum[ ] hemoptysis[ ]                                          NEGATIVE[ ]   GI:  nausea[ ]  vomiting [ ]  diarrhea[ ] constipation [ ] melena [ ]                                             NEGATIVE[ X]   : hematuria[ ]  dysuria[ ] urgency[ ] incontinence[ ]                                                                   NEGATIVE[ X]   MUSCULOSKELETAL:  arthritis[ ]  joint swelling [ ] muscle weakness [ ] Hx vein stripping [ ]   NEGATIVE[X ]   SKIN/BREAST:  rash[ ] itching [ ]  hair loss[ ] masses[ ]                                                                    NEGATIVE[ X]   PSYCH:  dementia [ ] depression [ ] anxiety[ ]                                                                                     NEGATIVE[X ]   HEME/LYMPH:  bruises easily[ ] enlarged lymph nodes[ ] tender lymph nodes[ ]                     NEGATIVE[ X]   ENDOCRINE:  cold intolerance[ ] heat intolerance[ ] polydipsia[ ]                                                NEGATIVE[ X]     PHYSICAL EXAM  Vital Signs Last 24 Hrs  T(C): 36.6 (10 Sep 2020 08:00), Max: 36.9 (09 Sep 2020 18:10)  T(F): 97.8 (10 Sep 2020 08:00), Max: 98.5 (09 Sep 2020 18:10)  HR: 60 (10 Sep 2020 10:00) (52 - 74)  BP: 117/68 (10 Sep 2020 10:00) (104/61 - 180/102)  BP(mean): 87 (10 Sep 2020 10:00) (78 - 120)  RR: 16 (10 Sep 2020 08:00) (9 - 24)  SpO2: 98% (10 Sep 2020 10:00) (96% - 99%)  Right arm bp:                       Left arm bp:    CONSTITUTIONAL:  WNL[ ]   Neuro: WNL[ ] Normal exam oriented to person/place & time with no focal motor or sensory  deficits. Other                     Eyes: WNL[ ]   Normal exam of conjunctiva & lids, pupils equally reactive. Other     ENT: WNL[ ]    Normal exam of nasal/oral mucosa with absence of cyanosis. Other  Neck: WNL[ ]  Normal exam of jugular veins, trachea & thyroid. Other  Chest: WNL[ ] Normal lung exam with good air movement absence of wheezes, rales or Ronchi                                                                             CV:  Auscultation: normal [ ] S3[ ] S4[ ] Irregular [ ] Rub[ ] Clicks[ ]    Murmurs none:[ ]systolic [ ]  diastolic [ ] holosystolic [ ]  Carotids: No Bruits[ ] Other                 Abdominal Aorta: normal [ ] nonpalpable[ ]Other                                                                                      GI: WNL[ ] Normal exam of abdomen, liver & spleen with no noted masses or tenderness. Other                                                                                                        Extremities: WNL[ ] Normal no evidence of cyanosis or deformity Edema: none[ ]trace[ ]1+[ ]2+[ ]3+[ ]4+[ ]  Lower Extremity Pulses: Right[ ] Left[ ]Varicosities[ ]  SKIN :WNL[ ] Normal exam to inspection & palpation. Other:                                                          LABS:                        13.2   5.61  )-----------( 162      ( 10 Sep 2020 04:22 )             41.8     09-10    139  |  106  |  28<H>  ----------------------------<  119<H>  3.9   |  24  |  1.0    Ca    8.6      10 Sep 2020 04:22  Mg     1.9     09-10    TPro  5.7<L>  /  Alb  3.6  /  TBili  0.3  /  DBili  x   /  AST  84<H>  /  ALT  31  /  AlkPhos  78  09-10    PT/INR - ( 09 Sep 2020 18:20 )   PT: 12.40 sec;   INR: 1.08 ratio         PTT - ( 10 Sep 2020 04:22 )  PTT:38.7 sec    CARDIAC MARKERS ( 09 Sep 2020 18:20 )  x     / <0.01 ng/mL / x     / x     / x              Covid Results:     Cardiac Cath:    TTE / BRENDA:    Recommendation: (Procedures/Evaluations)  CT HEAD Non-Contrast:[  ]  CT Chest with /without contrast [ ]  Echocadiography :[ ]  Carotid Duplex :[ ]  BARBRA/PVR: [ ]  PFT : Simple PFT [ ]  Full [ ]  Renal Consult [ ]  Pulmonary Consult: [ ]   Vascular Consult [ ]    Dental Consult [ ]   Hem-Onc Consult [ ]   GI Consult [ ]   Other Consultations :    STS Score:     Impression:    CAD [ ]  Valvular  disease [ ]   Aortic Disease [ ]   CAIO: Yes[ ] No [ ]   CKD Stage I [ ] , Stage II [ ] , Stage III [ ], Stage IV [ ]   Anemia: Yes [ ], No [ ]  Diabetes :Yes [ ], No [ ]  Acute MI: Yes [ ], No [ ]   Heart Failure: Yes [ ] , No [ ] HFpEF [ ], HFrEF [ ]        Assessment/ Plan: Surgeon: /Shannon/Reginaldo    Consult requesting by: Dr. German  PMD: Camron    HISTORY OF PRESENT ILLNESS:  History of present illness goes back to the day of presentation when the patient experienced the sudden onset of substernal chest pain. The pain is exertional, retrosternal, and radiates down the left arm. At the time of the onset of the pain, the patient was exercising on a treadmill. He stopped exercising and waited for the pain to subside. After an hour, the pain persisted so he decided to come to the ED. He has a strong family history of premature heart disease/CAD as well. He is a non-smoker.   In the ED, the patient was hypertensive to 180/102. EKG revealed diffuse ST-depressions in the anterior leads concerning for a posterior wall MI. Cardiology was called and took the patient emergently to the cath lab where it was discovered that he had significant triple-vessel CAD with 90% stenosis of the ostium of the LAD with 99% stenosis in the proximal third of the vessel, likely the culprit lesion. He was placed on a heparin drip and nitro drip for chest pain. Admitted to CCU for monitoring and will be transferred to CTU after evaluation for CABG.    PAST MEDICAL & SURGICAL HISTORY:  No pertinent past medical history  H/O shoulder surgery    MEDICATIONS  (STANDING):  aspirin  chewable 81 milliGRAM(s) Oral daily  atorvastatin 80 milliGRAM(s) Oral at bedtime  chlorhexidine 4% Liquid 1 Application(s) Topical <User Schedule>  heparin  Infusion 800 Unit(s)/Hr (9.5 mL/Hr) IV Continuous <Continuous>  influenza   Vaccine 0.5 milliLiter(s) IntraMuscular once  metoprolol tartrate 12.5 milliGRAM(s) Oral two times a day  nitroglycerin  Infusion 10 MICROgram(s)/Min (3 mL/Hr) IV Continuous <Continuous>  pantoprazole    Tablet 40 milliGRAM(s) Oral before breakfast  sodium chloride 0.9%. 1000 milliLiter(s) (100 mL/Hr) IV Continuous <Continuous>    Allergies    No Known Allergies      SOCIAL HISTORY:  Smoker: [ ] Yes  [x ] No        PACK YEARS:                         WHEN QUIT?  ETOH use: [ ] Yes  [x ] No              FREQUENCY / QUANTITY:  Ilicit Drug use:  [ ] Yes  [ x] No  Occupation:   Lives with: Wife and children  Assisted device use: None  5 meter walk test: 1_4___sec, 2___4_sec, 3_4__sec  FAMILY HISTORY:  Family history of coronary artery disease (Mother) - Stents at age 59y/o      Review of Systems  CONSTITUTIONAL:  Fevers[ ] chills[ ] sweats[ ] fatigue[ ] weight loss[ ] weight gain [ ]            NEGATIVE [X ]   NEURO:  parathesias[ ] seizures [ ]  syncope [ ]  confusion [ ]                                                       NEGATIVE[ X]   EYES: glasses[ ]  blurry vision[ ]  discharge[ ] pain[ ] glaucoma [ ]                                                 NEGATIVE[X ]   ENMT:  difficulty hearing [ ]  vertigo[ ]  dysphagia[ ] epistaxis[ ] recent dental work [ ]           NEGATIVE[ X]   CV:  chest pain[x ] palpitations[ ] HESS [ ] diaphoresis [ ]                                                                  NEGATIVE[ ]   RESPIRATORY:  wheezing[ ] SOB[ ] cough [ ] sputum[ ] hemoptysis[ ]                                          NEGATIVE[ ]   GI:  nausea[ ]  vomiting [ ]  diarrhea[ ] constipation [ ] melena [ ]                                             NEGATIVE[ X]   : hematuria[ ]  dysuria[ ] urgency[ ] incontinence[ ]                                                                   NEGATIVE[ X]   MUSCULOSKELETAL:  arthritis[ ]  joint swelling [ ] muscle weakness [ ] Hx vein stripping [ ]   NEGATIVE[X ]   SKIN/BREAST:  rash[ ] itching [ ]  hair loss[ ] masses[ ]                                                                    NEGATIVE[ X]   PSYCH:  dementia [ ] depression [ ] anxiety[ ]                                                                                     NEGATIVE[X ]   HEME/LYMPH:  bruises easily[ ] enlarged lymph nodes[ ] tender lymph nodes[ ]                     NEGATIVE[ X]   ENDOCRINE:  cold intolerance[ ] heat intolerance[ ] polydipsia[ ]                                                NEGATIVE[ X]     PHYSICAL EXAM  Vital Signs Last 24 Hrs  T(C): 36.6 (10 Sep 2020 08:00), Max: 36.9 (09 Sep 2020 18:10)  T(F): 97.8 (10 Sep 2020 08:00), Max: 98.5 (09 Sep 2020 18:10)  HR: 60 (10 Sep 2020 10:00) (52 - 74)  BP: 117/68 (10 Sep 2020 10:00) (104/61 - 180/102)  BP(mean): 87 (10 Sep 2020 10:00) (78 - 120)  RR: 16 (10 Sep 2020 08:00) (9 - 24)  SpO2: 98% (10 Sep 2020 10:00) (96% - 99%)      CONSTITUTIONAL:  WNL[ x]   Neuro: WNL[x ] Normal exam oriented to person/place & time with no focal motor or sensory  deficits. Other                     Eyes: WNL[x ]   Normal exam of conjunctiva & lids, pupils equally reactive. Other     ENT: WNL[ x]    Normal exam of nasal/oral mucosa with absence of cyanosis. Other  Neck: WNL[x ]  Normal exam of jugular veins, trachea & thyroid. Other  Chest: WNL[x ] Normal lung exam with good air movement absence of wheezes, rales or Ronchi                                                                             CV:  Auscultation: normal [x ] S3[ ] S4[ ] Irregular [ ] Rub[ ] Clicks[ ]    Murmurs none:[x ]systolic [ ]  diastolic [ ] holosystolic [ ]  Carotids: No Bruits[ x] Other                 Abdominal Aorta: normal [ ] nonpalpable[ ]Other                                                                                      GI: WNL[ x] Normal exam of abdomen, liver & spleen with no noted masses or tenderness. Other                                                                                                        Extremities: WNL[x ] Normal no evidence of cyanosis or deformity Edema: none[ ]trace[ ]1+[ ]2+[ ]3+[ ]4+[ ]  Lower Extremity Pulses: Right[ x] Left[x ]Varicosities[ ]  SKIN :WNL[ x] Normal exam to inspection & palpation. Other:                                                          LABS:                        13.2   5.61  )-----------( 162      ( 10 Sep 2020 04:22 )             41.8     09-10    139  |  106  |  28<H>  ----------------------------<  119<H>  3.9   |  24  |  1.0    Ca    8.6      10 Sep 2020 04:22  Mg     1.9     09-10    TPro  5.7<L>  /  Alb  3.6  /  TBili  0.3  /  DBili  x   /  AST  84<H>  /  ALT  31  /  AlkPhos  78  09-10    PT/INR - ( 09 Sep 2020 18:20 )   PT: 12.40 sec;   INR: 1.08 ratio         PTT - ( 10 Sep 2020 04:22 )  PTT:38.7 sec    CARDIAC MARKERS ( 09 Sep 2020 18:20 )  x     / <0.01 ng/mL / x     / x     / x              Covid Results: 9/9 - not detected    Cardiac Cath:  LEFT HEART CATHETERIZATION                                    The coronary circulation is right dominant.   Left main: minor luminal irregularities  LAD: diffuse 90 % stenosis prox lad.   D1: diffuse 90 % stenosis at the ostium.  diffuse 99 % stenosis in the proximal third of the vessel segment - likely culprit for the patient's clinical presentation.   Circumflex: 60 % stenosis at the ostium. 80 % stenosis distal lcx.   OM1: 90 % stenosis at the ostium  OM3: 50 % stenosis in the proximal third  RCA: moderate diffuse atherosclerosis   RPDA: 90 % stenosis at the ostium   Right posterolateral segment: moderate diffuse atherosclerosis      TTE / BRENDA: PENDING    Recommendation: (Procedures/Evaluations)  CT HEAD Non-Contrast:[  ]  CT Chest without contrast [X ]  Echocadiography :[X ]  Carotid Duplex :[X]  BARBRA/PVR: [ ]  PFT : Simple PFT [ X]  Full [ ]  Renal Consult [ ]  Pulmonary Consult: [ ]   Vascular Consult [ ]    Dental Consult [ ]   Hem-Onc Consult [ ]   GI Consult [ ]   Other Consultations :    STS Score:     Impression:    CAD [ X]  Valvular  disease [ ]   Aortic Disease [ ]   CAIO: Yes[ ] No [ X]   CKD Stage I [ ] , Stage II [ ] , Stage III [ ], Stage IV [ ]   Anemia: Yes [ ], No [ X]  Diabetes :Yes [ ], No [ X]  Acute MI: Yes [ X], No [ ]   Heart Failure: Yes [ ] , No [ X] HFpEF [ ], HFrEF [ ]        Assessment/ Plan:    50M here with severe 3vCAD - Pre-op for CABG tomorrow.  Needs CT chest non-contrast; carotid duplex; TTE; and simple spirometry today. Surgeon: /Shannon/Reginaldo    Consult requesting by: Dr. German  PMD: Camron    HISTORY OF PRESENT ILLNESS:  History of present illness goes back to the day of presentation when the patient experienced the sudden onset of substernal chest pain. The pain is exertional, retrosternal, and radiates down the left arm. At the time of the onset of the pain, the patient was exercising on a treadmill. He stopped exercising and waited for the pain to subside. After an hour, the pain persisted so he decided to come to the ED. He has a strong family history of premature heart disease/CAD as well. He is a non-smoker.   In the ED, the patient was hypertensive to 180/102. EKG revealed diffuse ST-depressions in the anterior leads concerning for a posterior wall MI. Cardiology was called and took the patient emergently to the cath lab where it was discovered that he had significant triple-vessel CAD with 90% stenosis of the ostium of the LAD with 99% stenosis in the proximal third of the vessel, likely the culprit lesion. He was placed on a heparin drip and nitro drip for chest pain. Admitted to CCU for monitoring and will be transferred to CTU after evaluation for CABG.    PAST MEDICAL & SURGICAL HISTORY:  No pertinent past medical history  H/O shoulder surgery    MEDICATIONS  (STANDING):  aspirin  chewable 81 milliGRAM(s) Oral daily  atorvastatin 80 milliGRAM(s) Oral at bedtime  chlorhexidine 4% Liquid 1 Application(s) Topical <User Schedule>  heparin  Infusion 800 Unit(s)/Hr (9.5 mL/Hr) IV Continuous <Continuous>  influenza   Vaccine 0.5 milliLiter(s) IntraMuscular once  metoprolol tartrate 12.5 milliGRAM(s) Oral two times a day  nitroglycerin  Infusion 10 MICROgram(s)/Min (3 mL/Hr) IV Continuous <Continuous>  pantoprazole    Tablet 40 milliGRAM(s) Oral before breakfast  sodium chloride 0.9%. 1000 milliLiter(s) (100 mL/Hr) IV Continuous <Continuous>    Allergies    No Known Allergies      SOCIAL HISTORY:  Smoker: [ ] Yes  [x ] No        PACK YEARS:                         WHEN QUIT?  ETOH use: [ ] Yes  [x ] No              FREQUENCY / QUANTITY:  Ilicit Drug use:  [ ] Yes  [ x] No  Occupation:   Lives with: Wife and children  Assisted device use: None  5 meter walk test: 1_4___sec, 2___4_sec, 3_4__sec  FAMILY HISTORY:  Family history of coronary artery disease (Mother) - Stents at age 59y/o      Review of Systems  CONSTITUTIONAL:  Fevers[ ] chills[ ] sweats[ ] fatigue[ ] weight loss[ ] weight gain [ ]            NEGATIVE [X ]   NEURO:  parathesias[ ] seizures [ ]  syncope [ ]  confusion [ ]                                                       NEGATIVE[ X]   EYES: glasses[ ]  blurry vision[ ]  discharge[ ] pain[ ] glaucoma [ ]                                                 NEGATIVE[X ]   ENMT:  difficulty hearing [ ]  vertigo[ ]  dysphagia[ ] epistaxis[ ] recent dental work [ ]           NEGATIVE[ X]   CV:  chest pain[x ] palpitations[ ] HESS [ ] diaphoresis [ ]                                                                  NEGATIVE[ ]   RESPIRATORY:  wheezing[ ] SOB[ ] cough [ ] sputum[ ] hemoptysis[ ]                                          NEGATIVE[ ]   GI:  nausea[ ]  vomiting [ ]  diarrhea[ ] constipation [ ] melena [ ]                                             NEGATIVE[ X]   : hematuria[ ]  dysuria[ ] urgency[ ] incontinence[ ]                                                                   NEGATIVE[ X]   MUSCULOSKELETAL:  arthritis[ ]  joint swelling [ ] muscle weakness [ ] Hx vein stripping [ ]   NEGATIVE[X ]   SKIN/BREAST:  rash[ ] itching [ ]  hair loss[ ] masses[ ]                                                                    NEGATIVE[ X]   PSYCH:  dementia [ ] depression [ ] anxiety[ ]                                                                                     NEGATIVE[X ]   HEME/LYMPH:  bruises easily[ ] enlarged lymph nodes[ ] tender lymph nodes[ ]                     NEGATIVE[ X]   ENDOCRINE:  cold intolerance[ ] heat intolerance[ ] polydipsia[ ]                                                NEGATIVE[ X]     PHYSICAL EXAM  Vital Signs Last 24 Hrs  T(C): 36.6 (10 Sep 2020 08:00), Max: 36.9 (09 Sep 2020 18:10)  T(F): 97.8 (10 Sep 2020 08:00), Max: 98.5 (09 Sep 2020 18:10)  HR: 60 (10 Sep 2020 10:00) (52 - 74)  BP: 117/68 (10 Sep 2020 10:00) (104/61 - 180/102)  BP(mean): 87 (10 Sep 2020 10:00) (78 - 120)  RR: 16 (10 Sep 2020 08:00) (9 - 24)  SpO2: 98% (10 Sep 2020 10:00) (96% - 99%)      CONSTITUTIONAL:  WNL[ x]   Neuro: WNL[x ] Normal exam oriented to person/place & time with no focal motor or sensory  deficits. Other                     Eyes: WNL[x ]   Normal exam of conjunctiva & lids, pupils equally reactive. Other     ENT: WNL[ x]    Normal exam of nasal/oral mucosa with absence of cyanosis. Other  Neck: WNL[x ]  Normal exam of jugular veins, trachea & thyroid. Other  Chest: WNL[x ] Normal lung exam with good air movement absence of wheezes, rales or Ronchi                                                                             CV:  Auscultation: normal [x ] S3[ ] S4[ ] Irregular [ ] Rub[ ] Clicks[ ]    Murmurs none:[x ]systolic [ ]  diastolic [ ] holosystolic [ ]  Carotids: No Bruits[ x] Other                 Abdominal Aorta: normal [ ] nonpalpable[ ]Other                                                                                      GI: WNL[ x] Normal exam of abdomen, liver & spleen with no noted masses or tenderness. Other                                                                                                        Extremities: WNL[x ] Normal no evidence of cyanosis or deformity Edema: none[ ]trace[ ]1+[ ]2+[ ]3+[ ]4+[ ]  Lower Extremity Pulses: Right[ x] Left[x ]Varicosities[ ]  SKIN :WNL[ x] Normal exam to inspection & palpation. Other:                                                          LABS:                        13.2   5.61  )-----------( 162      ( 10 Sep 2020 04:22 )             41.8     09-10    139  |  106  |  28<H>  ----------------------------<  119<H>  3.9   |  24  |  1.0    Ca    8.6      10 Sep 2020 04:22  Mg     1.9     09-10    TPro  5.7<L>  /  Alb  3.6  /  TBili  0.3  /  DBili  x   /  AST  84<H>  /  ALT  31  /  AlkPhos  78  09-10    PT/INR - ( 09 Sep 2020 18:20 )   PT: 12.40 sec;   INR: 1.08 ratio         PTT - ( 10 Sep 2020 04:22 )  PTT:38.7 sec    CARDIAC MARKERS ( 09 Sep 2020 18:20 )  x     / <0.01 ng/mL / x     / x     / x              Covid Results: 9/9 - not detected    Cardiac Cath:  LEFT HEART CATHETERIZATION                                    The coronary circulation is right dominant.   Left main: minor luminal irregularities  LAD: diffuse 90 % stenosis prox lad.   D1: diffuse 90 % stenosis at the ostium.  diffuse 99 % stenosis in the proximal third of the vessel segment - likely culprit for the patient's clinical presentation.   Circumflex: 60 % stenosis at the ostium. 80 % stenosis distal lcx.   OM1: 90 % stenosis at the ostium  OM3: 50 % stenosis in the proximal third  RCA: moderate diffuse atherosclerosis   RPDA: 90 % stenosis at the ostium   Right posterolateral segment: moderate diffuse atherosclerosis      TTE / BRENDA: PENDING    Recommendation: (Procedures/Evaluations)  CT HEAD Non-Contrast:[  ]  CT Chest without contrast [X ]  Echocadiography :[X ]  Carotid Duplex :[X]  BARBRA/PVR: [ ]  PFT : Simple PFT [ X]  Full [ ]  Renal Consult [ ]  Pulmonary Consult: [ ]   Vascular Consult [ ]    Dental Consult [ ]   Hem-Onc Consult [ ]   GI Consult [ ]   Other Consultations :    STS Score:   Procedure: Isolated CAB CALCULATE   Risk of Mortality: 	0.320% 	  Renal Failure: 	0.320% 	  Permanent Stroke: 	0.414% 	  Prolonged Ventilation: 	1.719% 	  DSW Infection: 	0.086% 	  Reoperation: 	1.887% 	  Morbidity or Mortality: 	3.291% 	  Short Length of Stay: 	80.977% 	  Long Length of Stay: 	0.636%	    	      Impression:    CAD [ X]  Valvular  disease [ ]   Aortic Disease [ ]   CAIO: Yes[ ] No [ X]   CKD Stage I [ ] , Stage II [ ] , Stage III [ ], Stage IV [ ]   Anemia: Yes [ ], No [ X]  Diabetes :Yes [ ], No [ X]  Acute MI: Yes [ X], No [ ]   Heart Failure: Yes [ ] , No [ X] HFpEF [ ], HFrEF [ ]        Assessment/ Plan:    50M here with severe 3vCAD - Pre-op for CABG tomorrow.  Needs CT chest non-contrast; carotid duplex; TTE; and simple spirometry today.

## 2020-09-10 NOTE — PRE-OP CHECKLIST - SELECT TESTS ORDERED
INR/Spirometry/EKG/CXR/Urinalysis/CBC/CMP/PT/PTT Spirometry/Type and Screen/CXR/Urinalysis/EKG/PT/PTT/Hepatic Function/CMP/INR/CBC

## 2020-09-11 LAB
ABO RH CONFIRMATION: SIGNIFICANT CHANGE UP
ALBUMIN SERPL ELPH-MCNC: 3.8 G/DL — SIGNIFICANT CHANGE UP (ref 3.5–5.2)
ALBUMIN SERPL ELPH-MCNC: 3.9 G/DL — SIGNIFICANT CHANGE UP (ref 3.5–5.2)
ALBUMIN SERPL ELPH-MCNC: 3.9 G/DL — SIGNIFICANT CHANGE UP (ref 3.5–5.2)
ALP SERPL-CCNC: 50 U/L — SIGNIFICANT CHANGE UP (ref 30–115)
ALP SERPL-CCNC: 78 U/L — SIGNIFICANT CHANGE UP (ref 30–115)
ALP SERPL-CCNC: 83 U/L — SIGNIFICANT CHANGE UP (ref 30–115)
ALT FLD-CCNC: 26 U/L — SIGNIFICANT CHANGE UP (ref 0–41)
ALT FLD-CCNC: 40 U/L — SIGNIFICANT CHANGE UP (ref 0–41)
ALT FLD-CCNC: 41 U/L — SIGNIFICANT CHANGE UP (ref 0–41)
ANION GAP SERPL CALC-SCNC: 8 MMOL/L — SIGNIFICANT CHANGE UP (ref 7–14)
ANION GAP SERPL CALC-SCNC: 9 MMOL/L — SIGNIFICANT CHANGE UP (ref 7–14)
ANION GAP SERPL CALC-SCNC: 9 MMOL/L — SIGNIFICANT CHANGE UP (ref 7–14)
APTT BLD: 27.7 SEC — SIGNIFICANT CHANGE UP (ref 27–39.2)
APTT BLD: 28.4 SEC — SIGNIFICANT CHANGE UP (ref 27–39.2)
APTT BLD: 38.3 SEC — SIGNIFICANT CHANGE UP (ref 27–39.2)
AST SERPL-CCNC: 61 U/L — HIGH (ref 0–41)
AST SERPL-CCNC: 92 U/L — HIGH (ref 0–41)
AST SERPL-CCNC: 97 U/L — HIGH (ref 0–41)
BASOPHILS # BLD AUTO: 0.02 K/UL — SIGNIFICANT CHANGE UP (ref 0–0.2)
BASOPHILS # BLD AUTO: 0.03 K/UL — SIGNIFICANT CHANGE UP (ref 0–0.2)
BASOPHILS NFR BLD AUTO: 0.4 % — SIGNIFICANT CHANGE UP (ref 0–1)
BASOPHILS NFR BLD AUTO: 0.6 % — SIGNIFICANT CHANGE UP (ref 0–1)
BILIRUB DIRECT SERPL-MCNC: <0.2 MG/DL — SIGNIFICANT CHANGE UP (ref 0–0.2)
BILIRUB INDIRECT FLD-MCNC: >0.2 MG/DL — SIGNIFICANT CHANGE UP (ref 0.2–1.2)
BILIRUB SERPL-MCNC: 0.4 MG/DL — SIGNIFICANT CHANGE UP (ref 0.2–1.2)
BILIRUB SERPL-MCNC: 0.4 MG/DL — SIGNIFICANT CHANGE UP (ref 0.2–1.2)
BILIRUB SERPL-MCNC: 0.7 MG/DL — SIGNIFICANT CHANGE UP (ref 0.2–1.2)
BLD GP AB SCN SERPL QL: SIGNIFICANT CHANGE UP
BUN SERPL-MCNC: 15 MG/DL — SIGNIFICANT CHANGE UP (ref 10–20)
BUN SERPL-MCNC: 18 MG/DL — SIGNIFICANT CHANGE UP (ref 10–20)
BUN SERPL-MCNC: 18 MG/DL — SIGNIFICANT CHANGE UP (ref 10–20)
CALCIUM SERPL-MCNC: 7.8 MG/DL — LOW (ref 8.5–10.1)
CALCIUM SERPL-MCNC: 8.8 MG/DL — SIGNIFICANT CHANGE UP (ref 8.5–10.1)
CALCIUM SERPL-MCNC: 9.1 MG/DL — SIGNIFICANT CHANGE UP (ref 8.5–10.1)
CHLORIDE SERPL-SCNC: 105 MMOL/L — SIGNIFICANT CHANGE UP (ref 98–110)
CHLORIDE SERPL-SCNC: 106 MMOL/L — SIGNIFICANT CHANGE UP (ref 98–110)
CHLORIDE SERPL-SCNC: 108 MMOL/L — SIGNIFICANT CHANGE UP (ref 98–110)
CO2 SERPL-SCNC: 22 MMOL/L — SIGNIFICANT CHANGE UP (ref 17–32)
CO2 SERPL-SCNC: 24 MMOL/L — SIGNIFICANT CHANGE UP (ref 17–32)
CO2 SERPL-SCNC: 25 MMOL/L — SIGNIFICANT CHANGE UP (ref 17–32)
CREAT SERPL-MCNC: 0.8 MG/DL — SIGNIFICANT CHANGE UP (ref 0.7–1.5)
EOSINOPHIL # BLD AUTO: 0.13 K/UL — SIGNIFICANT CHANGE UP (ref 0–0.7)
EOSINOPHIL # BLD AUTO: 0.16 K/UL — SIGNIFICANT CHANGE UP (ref 0–0.7)
EOSINOPHIL NFR BLD AUTO: 2.8 % — SIGNIFICANT CHANGE UP (ref 0–8)
EOSINOPHIL NFR BLD AUTO: 3.4 % — SIGNIFICANT CHANGE UP (ref 0–8)
FIBRINOGEN PPP-MCNC: 302 MG/DL — SIGNIFICANT CHANGE UP (ref 204.4–570.6)
GAS PNL BLDA: SIGNIFICANT CHANGE UP
GLUCOSE BLDC GLUCOMTR-MCNC: 107 MG/DL — HIGH (ref 70–99)
GLUCOSE BLDC GLUCOMTR-MCNC: 144 MG/DL — HIGH (ref 70–99)
GLUCOSE BLDC GLUCOMTR-MCNC: 146 MG/DL — HIGH (ref 70–99)
GLUCOSE BLDC GLUCOMTR-MCNC: 149 MG/DL — HIGH (ref 70–99)
GLUCOSE BLDC GLUCOMTR-MCNC: 154 MG/DL — HIGH (ref 70–99)
GLUCOSE SERPL-MCNC: 107 MG/DL — HIGH (ref 70–99)
GLUCOSE SERPL-MCNC: 135 MG/DL — HIGH (ref 70–99)
GLUCOSE SERPL-MCNC: 97 MG/DL — SIGNIFICANT CHANGE UP (ref 70–99)
HCT VFR BLD CALC: 31.3 % — LOW (ref 42–52)
HCT VFR BLD CALC: 33.8 % — LOW (ref 42–52)
HCT VFR BLD CALC: 42.3 % — SIGNIFICANT CHANGE UP (ref 42–52)
HCT VFR BLD CALC: 42.5 % — SIGNIFICANT CHANGE UP (ref 42–52)
HGB BLD-MCNC: 10.9 G/DL — LOW (ref 14–18)
HGB BLD-MCNC: 13.6 G/DL — LOW (ref 14–18)
HGB BLD-MCNC: 13.6 G/DL — LOW (ref 14–18)
HGB BLD-MCNC: 9.9 G/DL — LOW (ref 14–18)
IMM GRANULOCYTES NFR BLD AUTO: 0.2 % — SIGNIFICANT CHANGE UP (ref 0.1–0.3)
IMM GRANULOCYTES NFR BLD AUTO: 0.4 % — HIGH (ref 0.1–0.3)
INR BLD: 1.14 RATIO — SIGNIFICANT CHANGE UP (ref 0.65–1.3)
INR BLD: 1.35 RATIO — HIGH (ref 0.65–1.3)
INR BLD: 1.45 RATIO — HIGH (ref 0.65–1.3)
LYMPHOCYTES # BLD AUTO: 1.79 K/UL — SIGNIFICANT CHANGE UP (ref 1.2–3.4)
LYMPHOCYTES # BLD AUTO: 1.81 K/UL — SIGNIFICANT CHANGE UP (ref 1.2–3.4)
LYMPHOCYTES # BLD AUTO: 37.7 % — SIGNIFICANT CHANGE UP (ref 20.5–51.1)
LYMPHOCYTES # BLD AUTO: 38.5 % — SIGNIFICANT CHANGE UP (ref 20.5–51.1)
MAGNESIUM SERPL-MCNC: 1.7 MG/DL — LOW (ref 1.8–2.4)
MAGNESIUM SERPL-MCNC: 2 MG/DL — SIGNIFICANT CHANGE UP (ref 1.8–2.4)
MCHC RBC-ENTMCNC: 27.5 PG — SIGNIFICANT CHANGE UP (ref 27–31)
MCHC RBC-ENTMCNC: 27.6 PG — SIGNIFICANT CHANGE UP (ref 27–31)
MCHC RBC-ENTMCNC: 27.6 PG — SIGNIFICANT CHANGE UP (ref 27–31)
MCHC RBC-ENTMCNC: 28.1 PG — SIGNIFICANT CHANGE UP (ref 27–31)
MCHC RBC-ENTMCNC: 31.6 G/DL — LOW (ref 32–37)
MCHC RBC-ENTMCNC: 32 G/DL — SIGNIFICANT CHANGE UP (ref 32–37)
MCHC RBC-ENTMCNC: 32.2 G/DL — SIGNIFICANT CHANGE UP (ref 32–37)
MCHC RBC-ENTMCNC: 32.2 G/DL — SIGNIFICANT CHANGE UP (ref 32–37)
MCV RBC AUTO: 86 FL — SIGNIFICANT CHANGE UP (ref 80–94)
MCV RBC AUTO: 86 FL — SIGNIFICANT CHANGE UP (ref 80–94)
MCV RBC AUTO: 87.1 FL — SIGNIFICANT CHANGE UP (ref 80–94)
MCV RBC AUTO: 87.2 FL — SIGNIFICANT CHANGE UP (ref 80–94)
MONOCYTES # BLD AUTO: 0.8 K/UL — HIGH (ref 0.1–0.6)
MONOCYTES # BLD AUTO: 0.86 K/UL — HIGH (ref 0.1–0.6)
MONOCYTES NFR BLD AUTO: 17 % — HIGH (ref 1.7–9.3)
MONOCYTES NFR BLD AUTO: 18.1 % — HIGH (ref 1.7–9.3)
NEUTROPHILS # BLD AUTO: 1.89 K/UL — SIGNIFICANT CHANGE UP (ref 1.4–6.5)
NEUTROPHILS # BLD AUTO: 1.93 K/UL — SIGNIFICANT CHANGE UP (ref 1.4–6.5)
NEUTROPHILS NFR BLD AUTO: 39.8 % — LOW (ref 42.2–75.2)
NEUTROPHILS NFR BLD AUTO: 41.1 % — LOW (ref 42.2–75.2)
NRBC # BLD: 0 /100 WBCS — SIGNIFICANT CHANGE UP (ref 0–0)
NT-PROBNP SERPL-SCNC: 1015 PG/ML — HIGH (ref 0–300)
PLATELET # BLD AUTO: 104 K/UL — LOW (ref 130–400)
PLATELET # BLD AUTO: 109 K/UL — LOW (ref 130–400)
PLATELET # BLD AUTO: 144 K/UL — SIGNIFICANT CHANGE UP (ref 130–400)
PLATELET # BLD AUTO: 148 K/UL — SIGNIFICANT CHANGE UP (ref 130–400)
POTASSIUM SERPL-MCNC: 3.8 MMOL/L — SIGNIFICANT CHANGE UP (ref 3.5–5)
POTASSIUM SERPL-MCNC: 4 MMOL/L — SIGNIFICANT CHANGE UP (ref 3.5–5)
POTASSIUM SERPL-MCNC: 4.1 MMOL/L — SIGNIFICANT CHANGE UP (ref 3.5–5)
POTASSIUM SERPL-SCNC: 3.8 MMOL/L — SIGNIFICANT CHANGE UP (ref 3.5–5)
POTASSIUM SERPL-SCNC: 4 MMOL/L — SIGNIFICANT CHANGE UP (ref 3.5–5)
POTASSIUM SERPL-SCNC: 4.1 MMOL/L — SIGNIFICANT CHANGE UP (ref 3.5–5)
PROT SERPL-MCNC: 5.2 G/DL — LOW (ref 6–8)
PROT SERPL-MCNC: 5.9 G/DL — LOW (ref 6–8)
PROT SERPL-MCNC: 6 G/DL — SIGNIFICANT CHANGE UP (ref 6–8)
PROTHROM AB SERPL-ACNC: 13.1 SEC — HIGH (ref 9.95–12.87)
PROTHROM AB SERPL-ACNC: 15.5 SEC — HIGH (ref 9.95–12.87)
PROTHROM AB SERPL-ACNC: 16.7 SEC — HIGH (ref 9.95–12.87)
RBC # BLD: 3.59 M/UL — LOW (ref 4.7–6.1)
RBC # BLD: 3.88 M/UL — LOW (ref 4.7–6.1)
RBC # BLD: 4.92 M/UL — SIGNIFICANT CHANGE UP (ref 4.7–6.1)
RBC # BLD: 4.94 M/UL — SIGNIFICANT CHANGE UP (ref 4.7–6.1)
RBC # FLD: 12.4 % — SIGNIFICANT CHANGE UP (ref 11.5–14.5)
RBC # FLD: 12.5 % — SIGNIFICANT CHANGE UP (ref 11.5–14.5)
RBC # FLD: 12.6 % — SIGNIFICANT CHANGE UP (ref 11.5–14.5)
RBC # FLD: 12.6 % — SIGNIFICANT CHANGE UP (ref 11.5–14.5)
SODIUM SERPL-SCNC: 138 MMOL/L — SIGNIFICANT CHANGE UP (ref 135–146)
SODIUM SERPL-SCNC: 139 MMOL/L — SIGNIFICANT CHANGE UP (ref 135–146)
SODIUM SERPL-SCNC: 139 MMOL/L — SIGNIFICANT CHANGE UP (ref 135–146)
WBC # BLD: 4.7 K/UL — LOW (ref 4.8–10.8)
WBC # BLD: 4.75 K/UL — LOW (ref 4.8–10.8)
WBC # BLD: 8.8 K/UL — SIGNIFICANT CHANGE UP (ref 4.8–10.8)
WBC # BLD: 8.89 K/UL — SIGNIFICANT CHANGE UP (ref 4.8–10.8)
WBC # FLD AUTO: 4.7 K/UL — LOW (ref 4.8–10.8)
WBC # FLD AUTO: 4.75 K/UL — LOW (ref 4.8–10.8)
WBC # FLD AUTO: 8.8 K/UL — SIGNIFICANT CHANGE UP (ref 4.8–10.8)
WBC # FLD AUTO: 8.89 K/UL — SIGNIFICANT CHANGE UP (ref 4.8–10.8)

## 2020-09-11 PROCEDURE — 93010 ELECTROCARDIOGRAM REPORT: CPT

## 2020-09-11 PROCEDURE — 35600 OPEN HRV UXTR ART 1 SGM CAB: CPT | Mod: 80

## 2020-09-11 PROCEDURE — 33508 ENDOSCOPIC VEIN HARVEST: CPT

## 2020-09-11 PROCEDURE — 33534 CABG ARTERIAL TWO: CPT

## 2020-09-11 PROCEDURE — 71045 X-RAY EXAM CHEST 1 VIEW: CPT | Mod: 26

## 2020-09-11 PROCEDURE — 33518 CABG ARTERY-VEIN TWO: CPT | Mod: 80

## 2020-09-11 PROCEDURE — 33534 CABG ARTERIAL TWO: CPT | Mod: 80

## 2020-09-11 PROCEDURE — 35600 OPEN HRV UXTR ART 1 SGM CAB: CPT

## 2020-09-11 PROCEDURE — 33508 ENDOSCOPIC VEIN HARVEST: CPT | Mod: 80

## 2020-09-11 PROCEDURE — 33518 CABG ARTERY-VEIN TWO: CPT

## 2020-09-11 RX ORDER — OXYCODONE HYDROCHLORIDE 5 MG/1
5 TABLET ORAL EVERY 4 HOURS
Refills: 0 | Status: DISCONTINUED | OUTPATIENT
Start: 2020-09-11 | End: 2020-09-15

## 2020-09-11 RX ORDER — IPRATROPIUM BROMIDE 0.2 MG/ML
2 SOLUTION, NON-ORAL INHALATION EVERY 6 HOURS
Refills: 0 | Status: DISCONTINUED | OUTPATIENT
Start: 2020-09-11 | End: 2020-09-15

## 2020-09-11 RX ORDER — CEFAZOLIN SODIUM 1 G
1000 VIAL (EA) INJECTION EVERY 8 HOURS
Refills: 0 | Status: COMPLETED | OUTPATIENT
Start: 2020-09-11 | End: 2020-09-12

## 2020-09-11 RX ORDER — CHLORHEXIDINE GLUCONATE 213 G/1000ML
5 SOLUTION TOPICAL EVERY 4 HOURS
Refills: 0 | Status: DISCONTINUED | OUTPATIENT
Start: 2020-09-11 | End: 2020-09-12

## 2020-09-11 RX ORDER — NITROGLYCERIN 6.5 MG
5 CAPSULE, EXTENDED RELEASE ORAL
Qty: 50 | Refills: 0 | Status: DISCONTINUED | OUTPATIENT
Start: 2020-09-11 | End: 2020-09-14

## 2020-09-11 RX ORDER — INSULIN HUMAN 100 [IU]/ML
1 INJECTION, SOLUTION SUBCUTANEOUS
Qty: 100 | Refills: 0 | Status: DISCONTINUED | OUTPATIENT
Start: 2020-09-11 | End: 2020-09-13

## 2020-09-11 RX ORDER — PROPOFOL 10 MG/ML
1 INJECTION, EMULSION INTRAVENOUS
Qty: 1000 | Refills: 0 | Status: DISCONTINUED | OUTPATIENT
Start: 2020-09-11 | End: 2020-09-12

## 2020-09-11 RX ORDER — DEXMEDETOMIDINE HYDROCHLORIDE IN 0.9% SODIUM CHLORIDE 4 UG/ML
1 INJECTION INTRAVENOUS
Qty: 200 | Refills: 0 | Status: DISCONTINUED | OUTPATIENT
Start: 2020-09-11 | End: 2020-09-12

## 2020-09-11 RX ORDER — SENNA PLUS 8.6 MG/1
2 TABLET ORAL AT BEDTIME
Refills: 0 | Status: DISCONTINUED | OUTPATIENT
Start: 2020-09-11 | End: 2020-09-15

## 2020-09-11 RX ORDER — MEPERIDINE HYDROCHLORIDE 50 MG/ML
25 INJECTION INTRAMUSCULAR; INTRAVENOUS; SUBCUTANEOUS ONCE
Refills: 0 | Status: DISCONTINUED | OUTPATIENT
Start: 2020-09-11 | End: 2020-09-12

## 2020-09-11 RX ORDER — FAMOTIDINE 10 MG/ML
20 INJECTION INTRAVENOUS EVERY 12 HOURS
Refills: 0 | Status: DISCONTINUED | OUTPATIENT
Start: 2020-09-11 | End: 2020-09-12

## 2020-09-11 RX ORDER — OXYCODONE HYDROCHLORIDE 5 MG/1
10 TABLET ORAL EVERY 4 HOURS
Refills: 0 | Status: DISCONTINUED | OUTPATIENT
Start: 2020-09-11 | End: 2020-09-15

## 2020-09-11 RX ORDER — ALBUMIN HUMAN 25 %
500 VIAL (ML) INTRAVENOUS ONCE
Refills: 0 | Status: COMPLETED | OUTPATIENT
Start: 2020-09-11 | End: 2020-09-11

## 2020-09-11 RX ORDER — NICARDIPINE HYDROCHLORIDE 30 MG/1
5 CAPSULE, EXTENDED RELEASE ORAL
Qty: 40 | Refills: 0 | Status: DISCONTINUED | OUTPATIENT
Start: 2020-09-11 | End: 2020-09-12

## 2020-09-11 RX ORDER — VASOPRESSIN 20 [USP'U]/ML
0.04 INJECTION INTRAVENOUS
Qty: 50 | Refills: 0 | Status: DISCONTINUED | OUTPATIENT
Start: 2020-09-11 | End: 2020-09-12

## 2020-09-11 RX ORDER — MAGNESIUM SULFATE 500 MG/ML
1 VIAL (ML) INJECTION EVERY 12 HOURS
Refills: 0 | Status: DISCONTINUED | OUTPATIENT
Start: 2020-09-11 | End: 2020-09-15

## 2020-09-11 RX ORDER — ASPIRIN/CALCIUM CARB/MAGNESIUM 324 MG
300 TABLET ORAL ONCE
Refills: 0 | Status: DISCONTINUED | OUTPATIENT
Start: 2020-09-11 | End: 2020-09-12

## 2020-09-11 RX ORDER — ALBUTEROL 90 UG/1
2 AEROSOL, METERED ORAL EVERY 6 HOURS
Refills: 0 | Status: DISCONTINUED | OUTPATIENT
Start: 2020-09-11 | End: 2020-09-12

## 2020-09-11 RX ORDER — DEXTROSE 50 % IN WATER 50 %
50 SYRINGE (ML) INTRAVENOUS
Refills: 0 | Status: DISCONTINUED | OUTPATIENT
Start: 2020-09-11 | End: 2020-09-15

## 2020-09-11 RX ORDER — POLYETHYLENE GLYCOL 3350 17 G/17G
17 POWDER, FOR SOLUTION ORAL DAILY
Refills: 0 | Status: DISCONTINUED | OUTPATIENT
Start: 2020-09-11 | End: 2020-09-15

## 2020-09-11 RX ORDER — NOREPINEPHRINE BITARTRATE/D5W 8 MG/250ML
0.05 PLASTIC BAG, INJECTION (ML) INTRAVENOUS
Qty: 8 | Refills: 0 | Status: DISCONTINUED | OUTPATIENT
Start: 2020-09-11 | End: 2020-09-12

## 2020-09-11 RX ORDER — SODIUM CHLORIDE 9 MG/ML
1000 INJECTION INTRAMUSCULAR; INTRAVENOUS; SUBCUTANEOUS
Refills: 0 | Status: DISCONTINUED | OUTPATIENT
Start: 2020-09-11 | End: 2020-09-15

## 2020-09-11 RX ADMIN — CHLORHEXIDINE GLUCONATE 5 MILLILITER(S): 213 SOLUTION TOPICAL at 18:14

## 2020-09-11 RX ADMIN — INSULIN HUMAN 1 UNIT(S)/HR: 100 INJECTION, SOLUTION SUBCUTANEOUS at 16:53

## 2020-09-11 RX ADMIN — Medication 1.5 MICROGRAM(S)/MIN: at 16:52

## 2020-09-11 RX ADMIN — FAMOTIDINE 20 MILLIGRAM(S): 10 INJECTION INTRAVENOUS at 18:58

## 2020-09-11 RX ADMIN — Medication 250 MILLILITER(S): at 17:00

## 2020-09-11 RX ADMIN — CHLORHEXIDINE GLUCONATE 5 MILLILITER(S): 213 SOLUTION TOPICAL at 21:01

## 2020-09-11 RX ADMIN — CHLORHEXIDINE GLUCONATE 10 MILLILITER(S): 213 SOLUTION TOPICAL at 06:01

## 2020-09-11 RX ADMIN — Medication 250 MILLILITER(S): at 21:08

## 2020-09-11 RX ADMIN — Medication 100 MILLIGRAM(S): at 20:45

## 2020-09-11 RX ADMIN — SODIUM CHLORIDE 10 MILLILITER(S): 9 INJECTION INTRAMUSCULAR; INTRAVENOUS; SUBCUTANEOUS at 16:54

## 2020-09-11 RX ADMIN — DEXMEDETOMIDINE HYDROCHLORIDE IN 0.9% SODIUM CHLORIDE 17 MICROGRAM(S)/KG/HR: 4 INJECTION INTRAVENOUS at 16:51

## 2020-09-11 RX ADMIN — Medication 6.38 MICROGRAM(S)/KG/MIN: at 16:53

## 2020-09-11 RX ADMIN — CHLORHEXIDINE GLUCONATE 1 APPLICATION(S): 213 SOLUTION TOPICAL at 06:02

## 2020-09-11 RX ADMIN — Medication 100 GRAM(S): at 18:13

## 2020-09-11 NOTE — PACU DISCHARGE NOTE - COMMENTS
HR 80 atrial paced  T 35.2  RR 16  Sat 100  CVP 22  PA   CO 4.6/2.6 HR 80 atrial paced  BP 90/56  T 35.2  RR 16  Sat 100  CVP 22  PA 35/22  CO 4.6/2.6

## 2020-09-11 NOTE — BRIEF OPERATIVE NOTE - NSICDXBRIEFPOSTOP_GEN_ALL_CORE_FT
POST-OP DIAGNOSIS:  Non-ST elevation MI (NSTEMI) 11-Sep-2020 15:11:39  Seth Ortega  Triple vessel coronary artery disease 11-Sep-2020 15:11:29  Seth Ortega

## 2020-09-11 NOTE — BRIEF OPERATIVE NOTE - NSICDXBRIEFPREOP_GEN_ALL_CORE_FT
PRE-OP DIAGNOSIS:  Triple vessel coronary artery disease 11-Sep-2020 15:11:19  Seth Ortega  Non-ST elevation MI (NSTEMI) 11-Sep-2020 15:11:09  Seth Ortega

## 2020-09-12 LAB
ALBUMIN SERPL ELPH-MCNC: 4.3 G/DL — SIGNIFICANT CHANGE UP (ref 3.5–5.2)
ALP SERPL-CCNC: 41 U/L — SIGNIFICANT CHANGE UP (ref 30–115)
ALT FLD-CCNC: 21 U/L — SIGNIFICANT CHANGE UP (ref 0–41)
ANION GAP SERPL CALC-SCNC: 7 MMOL/L — SIGNIFICANT CHANGE UP (ref 7–14)
APTT BLD: 27.9 SEC — SIGNIFICANT CHANGE UP (ref 27–39.2)
AST SERPL-CCNC: 48 U/L — HIGH (ref 0–41)
BILIRUB SERPL-MCNC: 1.4 MG/DL — HIGH (ref 0.2–1.2)
BUN SERPL-MCNC: 15 MG/DL — SIGNIFICANT CHANGE UP (ref 10–20)
CALCIUM SERPL-MCNC: 8.4 MG/DL — LOW (ref 8.5–10.1)
CHLORIDE SERPL-SCNC: 108 MMOL/L — SIGNIFICANT CHANGE UP (ref 98–110)
CO2 SERPL-SCNC: 22 MMOL/L — SIGNIFICANT CHANGE UP (ref 17–32)
CREAT SERPL-MCNC: 0.8 MG/DL — SIGNIFICANT CHANGE UP (ref 0.7–1.5)
GLUCOSE BLDC GLUCOMTR-MCNC: 103 MG/DL — HIGH (ref 70–99)
GLUCOSE BLDC GLUCOMTR-MCNC: 123 MG/DL — HIGH (ref 70–99)
GLUCOSE BLDC GLUCOMTR-MCNC: 129 MG/DL — HIGH (ref 70–99)
GLUCOSE BLDC GLUCOMTR-MCNC: 136 MG/DL — HIGH (ref 70–99)
GLUCOSE BLDC GLUCOMTR-MCNC: 137 MG/DL — HIGH (ref 70–99)
GLUCOSE BLDC GLUCOMTR-MCNC: 143 MG/DL — HIGH (ref 70–99)
GLUCOSE BLDC GLUCOMTR-MCNC: 170 MG/DL — HIGH (ref 70–99)
GLUCOSE BLDC GLUCOMTR-MCNC: 97 MG/DL — SIGNIFICANT CHANGE UP (ref 70–99)
GLUCOSE SERPL-MCNC: 135 MG/DL — HIGH (ref 70–99)
HCT VFR BLD CALC: 29 % — LOW (ref 42–52)
HGB BLD-MCNC: 9.2 G/DL — LOW (ref 14–18)
INR BLD: 1.49 RATIO — HIGH (ref 0.65–1.3)
MAGNESIUM SERPL-MCNC: 1.8 MG/DL — SIGNIFICANT CHANGE UP (ref 1.8–2.4)
MCHC RBC-ENTMCNC: 27.5 PG — SIGNIFICANT CHANGE UP (ref 27–31)
MCHC RBC-ENTMCNC: 31.7 G/DL — LOW (ref 32–37)
MCV RBC AUTO: 86.8 FL — SIGNIFICANT CHANGE UP (ref 80–94)
NRBC # BLD: 0 /100 WBCS — SIGNIFICANT CHANGE UP (ref 0–0)
PLATELET # BLD AUTO: 100 K/UL — LOW (ref 130–400)
POTASSIUM SERPL-MCNC: 4.6 MMOL/L — SIGNIFICANT CHANGE UP (ref 3.5–5)
POTASSIUM SERPL-SCNC: 4.6 MMOL/L — SIGNIFICANT CHANGE UP (ref 3.5–5)
PROT SERPL-MCNC: 5.3 G/DL — LOW (ref 6–8)
PROTHROM AB SERPL-ACNC: 17.1 SEC — HIGH (ref 9.95–12.87)
RBC # BLD: 3.34 M/UL — LOW (ref 4.7–6.1)
RBC # FLD: 12.5 % — SIGNIFICANT CHANGE UP (ref 11.5–14.5)
SODIUM SERPL-SCNC: 137 MMOL/L — SIGNIFICANT CHANGE UP (ref 135–146)
WBC # BLD: 7.07 K/UL — SIGNIFICANT CHANGE UP (ref 4.8–10.8)
WBC # FLD AUTO: 7.07 K/UL — SIGNIFICANT CHANGE UP (ref 4.8–10.8)

## 2020-09-12 PROCEDURE — 93010 ELECTROCARDIOGRAM REPORT: CPT

## 2020-09-12 PROCEDURE — 99233 SBSQ HOSP IP/OBS HIGH 50: CPT

## 2020-09-12 PROCEDURE — 71045 X-RAY EXAM CHEST 1 VIEW: CPT | Mod: 26,76

## 2020-09-12 RX ORDER — HEPARIN SODIUM 5000 [USP'U]/ML
5000 INJECTION INTRAVENOUS; SUBCUTANEOUS EVERY 8 HOURS
Refills: 0 | Status: DISCONTINUED | OUTPATIENT
Start: 2020-09-12 | End: 2020-09-15

## 2020-09-12 RX ORDER — FENTANYL CITRATE 50 UG/ML
25 INJECTION INTRAVENOUS
Refills: 0 | Status: DISCONTINUED | OUTPATIENT
Start: 2020-09-12 | End: 2020-09-15

## 2020-09-12 RX ORDER — SIMETHICONE 80 MG/1
80 TABLET, CHEWABLE ORAL EVERY 8 HOURS
Refills: 0 | Status: DISCONTINUED | OUTPATIENT
Start: 2020-09-12 | End: 2020-09-15

## 2020-09-12 RX ORDER — ATORVASTATIN CALCIUM 80 MG/1
80 TABLET, FILM COATED ORAL AT BEDTIME
Refills: 0 | Status: DISCONTINUED | OUTPATIENT
Start: 2020-09-12 | End: 2020-09-15

## 2020-09-12 RX ORDER — ISOSORBIDE DINITRATE 5 MG/1
5 TABLET ORAL THREE TIMES A DAY
Refills: 0 | Status: DISCONTINUED | OUTPATIENT
Start: 2020-09-12 | End: 2020-09-14

## 2020-09-12 RX ORDER — ASPIRIN/CALCIUM CARB/MAGNESIUM 324 MG
325 TABLET ORAL DAILY
Refills: 0 | Status: DISCONTINUED | OUTPATIENT
Start: 2020-09-12 | End: 2020-09-15

## 2020-09-12 RX ORDER — CHLORHEXIDINE GLUCONATE 213 G/1000ML
1 SOLUTION TOPICAL
Refills: 0 | Status: DISCONTINUED | OUTPATIENT
Start: 2020-09-12 | End: 2020-09-15

## 2020-09-12 RX ORDER — FAMOTIDINE 10 MG/ML
20 INJECTION INTRAVENOUS DAILY
Refills: 0 | Status: DISCONTINUED | OUTPATIENT
Start: 2020-09-12 | End: 2020-09-15

## 2020-09-12 RX ADMIN — Medication 100 GRAM(S): at 06:00

## 2020-09-12 RX ADMIN — ISOSORBIDE DINITRATE 5 MILLIGRAM(S): 5 TABLET ORAL at 15:22

## 2020-09-12 RX ADMIN — OXYCODONE HYDROCHLORIDE 10 MILLIGRAM(S): 5 TABLET ORAL at 07:40

## 2020-09-12 RX ADMIN — Medication 100 GRAM(S): at 17:28

## 2020-09-12 RX ADMIN — FENTANYL CITRATE 25 MICROGRAM(S): 50 INJECTION INTRAVENOUS at 05:15

## 2020-09-12 RX ADMIN — OXYCODONE HYDROCHLORIDE 10 MILLIGRAM(S): 5 TABLET ORAL at 12:41

## 2020-09-12 RX ADMIN — FAMOTIDINE 20 MILLIGRAM(S): 10 INJECTION INTRAVENOUS at 05:20

## 2020-09-12 RX ADMIN — FENTANYL CITRATE 25 MICROGRAM(S): 50 INJECTION INTRAVENOUS at 08:00

## 2020-09-12 RX ADMIN — FENTANYL CITRATE 25 MICROGRAM(S): 50 INJECTION INTRAVENOUS at 12:40

## 2020-09-12 RX ADMIN — FENTANYL CITRATE 25 MICROGRAM(S): 50 INJECTION INTRAVENOUS at 01:15

## 2020-09-12 RX ADMIN — Medication 600 MILLIGRAM(S): at 17:28

## 2020-09-12 RX ADMIN — FENTANYL CITRATE 25 MICROGRAM(S): 50 INJECTION INTRAVENOUS at 03:15

## 2020-09-12 RX ADMIN — OXYCODONE HYDROCHLORIDE 10 MILLIGRAM(S): 5 TABLET ORAL at 18:00

## 2020-09-12 RX ADMIN — ISOSORBIDE DINITRATE 5 MILLIGRAM(S): 5 TABLET ORAL at 21:04

## 2020-09-12 RX ADMIN — Medication 100 MILLIGRAM(S): at 12:38

## 2020-09-12 RX ADMIN — Medication 600 MILLIGRAM(S): at 06:00

## 2020-09-12 RX ADMIN — Medication 325 MILLIGRAM(S): at 12:38

## 2020-09-12 RX ADMIN — Medication 100 MILLIGRAM(S): at 04:30

## 2020-09-12 RX ADMIN — FENTANYL CITRATE 25 MICROGRAM(S): 50 INJECTION INTRAVENOUS at 12:20

## 2020-09-12 RX ADMIN — INSULIN HUMAN 1 UNIT(S)/HR: 100 INJECTION, SOLUTION SUBCUTANEOUS at 07:00

## 2020-09-12 RX ADMIN — HEPARIN SODIUM 5000 UNIT(S): 5000 INJECTION INTRAVENOUS; SUBCUTANEOUS at 21:04

## 2020-09-12 RX ADMIN — SIMETHICONE 80 MILLIGRAM(S): 80 TABLET, CHEWABLE ORAL at 15:22

## 2020-09-12 RX ADMIN — SENNA PLUS 2 TABLET(S): 8.6 TABLET ORAL at 21:04

## 2020-09-12 RX ADMIN — OXYCODONE HYDROCHLORIDE 10 MILLIGRAM(S): 5 TABLET ORAL at 17:28

## 2020-09-12 RX ADMIN — OXYCODONE HYDROCHLORIDE 10 MILLIGRAM(S): 5 TABLET ORAL at 21:05

## 2020-09-12 RX ADMIN — FENTANYL CITRATE 25 MICROGRAM(S): 50 INJECTION INTRAVENOUS at 03:00

## 2020-09-12 RX ADMIN — ATORVASTATIN CALCIUM 80 MILLIGRAM(S): 80 TABLET, FILM COATED ORAL at 21:04

## 2020-09-12 RX ADMIN — FAMOTIDINE 20 MILLIGRAM(S): 10 INJECTION INTRAVENOUS at 12:39

## 2020-09-12 RX ADMIN — FENTANYL CITRATE 25 MICROGRAM(S): 50 INJECTION INTRAVENOUS at 15:22

## 2020-09-12 RX ADMIN — FENTANYL CITRATE 25 MICROGRAM(S): 50 INJECTION INTRAVENOUS at 05:00

## 2020-09-12 RX ADMIN — HEPARIN SODIUM 5000 UNIT(S): 5000 INJECTION INTRAVENOUS; SUBCUTANEOUS at 15:22

## 2020-09-12 RX ADMIN — OXYCODONE HYDROCHLORIDE 10 MILLIGRAM(S): 5 TABLET ORAL at 21:40

## 2020-09-12 RX ADMIN — OXYCODONE HYDROCHLORIDE 10 MILLIGRAM(S): 5 TABLET ORAL at 13:00

## 2020-09-12 RX ADMIN — CHLORHEXIDINE GLUCONATE 1 APPLICATION(S): 213 SOLUTION TOPICAL at 00:30

## 2020-09-12 RX ADMIN — SODIUM CHLORIDE 10 MILLILITER(S): 9 INJECTION INTRAMUSCULAR; INTRAVENOUS; SUBCUTANEOUS at 07:00

## 2020-09-12 RX ADMIN — OXYCODONE HYDROCHLORIDE 10 MILLIGRAM(S): 5 TABLET ORAL at 08:00

## 2020-09-12 RX ADMIN — Medication 1.5 MICROGRAM(S)/MIN: at 07:00

## 2020-09-12 RX ADMIN — FENTANYL CITRATE 25 MICROGRAM(S): 50 INJECTION INTRAVENOUS at 08:15

## 2020-09-12 RX ADMIN — FENTANYL CITRATE 25 MICROGRAM(S): 50 INJECTION INTRAVENOUS at 16:00

## 2020-09-12 RX ADMIN — SIMETHICONE 80 MILLIGRAM(S): 80 TABLET, CHEWABLE ORAL at 21:04

## 2020-09-12 RX ADMIN — FENTANYL CITRATE 25 MICROGRAM(S): 50 INJECTION INTRAVENOUS at 01:00

## 2020-09-12 NOTE — PROGRESS NOTE ADULT - ASSESSMENT
PROBLEMS:  I spent 45 minutes of time examining patient, reviewing vitals, labs, medications, imaging and discussing with the team goals of care to prevent life-threatening in this patient who is at high risk for deterioration or death due to:    CAD - s/p CABG - lopressor 25, isosorbide 5, lipitor 80, d/c S-G, A.Line, CT, Sump, Adams  HTN - wean off NTG drip  Anemia - observe  thrombocytopenia - observe  postthoracotomy pain - add Dilaudid and IV Tylenol  abdominal distention - simethicone 80     PLAN  Neuro: move all 4 extremities. no sensory or motor deficits  Pain management.   aspirin 325 milliGRAM(s) Oral daily  fentaNYL    Injectable 25 MICROGram(s) IV Push every 2 hours PRN  oxyCODONE    IR 10 milliGRAM(s) Oral every 4 hours PRN  oxyCODONE    IR 5 milliGRAM(s) Oral every 4 hours PRN    Pulm: Wean off supplemental oxygen as able. Daily CXR. Encourage coughing, deep breathing and use of incentive spirometry.   guaiFENesin  milliGRAM(s) Oral every 12 hours  ipratropium 17 MICROgram(s) HFA Inhaler 2 Puff(s) Inhalation every 6 hours    Cardio: Monitor telemetry/alarms. Continue supportive care   isosorbide   dinitrate Tablet (ISORDIL) 5 milliGRAM(s) Oral three times a day  metoprolol tartrate 25 milliGRAM(s) Oral two times a day  nitroglycerin  Infusion 5 MICROgram(s)/Min IV Continuous <Continuous>    GI: Continue stool softeners.    famotidine    Tablet 20 milliGRAM(s) Oral daily  polyethylene glycol 3350 17 Gram(s) Oral daily  senna 2 Tablet(s) Oral at bedtime  simethicone 80 milliGRAM(s) Chew every 8 hours    Nutrition: Continue present diet  Endocrine and glucose control:   atorvastatin 80 milliGRAM(s) Oral at bedtime  dextrose 50% Injectable 50 milliLiter(s) IV Push every 15 minutes  insulin regular Infusion 1 Unit(s)/Hr IV Continuous <Continuous>    Renal: monitor urine output, supplement electrolytes as needed,     Vasc: Heparin SC and/or SCDs for DVT prophylaxis  heparin   Injectable 5000 Unit(s) SubCutaneous every 8 hours    ID: Stable, no fever , no chills.   ceFAZolin   IVPB 1000 milliGRAM(s) IV Intermittent every 8 hours    Tubes: Monitor Chest tube output  Therapy: OOB/ambulate  Disposition: start planing discharge home or placement    Pertinent clinical, laboratory, radiographic, hemodynamic, echocardiographic, respiratory data, microbiologic data and chart were reviewed and analyzed frequently throughout the course of the day and night. GI and DVT prophylaxis, glycemic control, head of bed elevation and skin care issues were addressed.  Patient seen, examined and plan discussed with CT Surgery / CTICU team during rounds.    [ ] The patient remains in critical and unstable condition, and requires ICU care and monitoring  [x ] The patient is improving but requires continued monitoring and adjustment of therapy

## 2020-09-12 NOTE — PROGRESS NOTE ADULT - SUBJECTIVE AND OBJECTIVE BOX
CTU Attending Progress Daily Note     12 Sep 2020 11:47  Admited 09-09-20, Hospital Day 3d  POD# - 1    HPI:  Chief Complaint: Chest Pain       Past Medical History: Triple-vessel CAD      Past Surgical History: None relevant       History of present illness goes back to the day of presentation when the patient experienced the sudden onset of substernal chest pain. The pain is exertional, retrosternal, and radiates down the left arm. At the time of the onset of the pain, the patient was exercising on a treadmill. He stopped exercising and waited for the pain to subside. After an hour, the pain persisted so he decided to come to the ED. He has a strong family history of premature heart disease/CAD as well. He is a non-smoker.       In the ED, the patient was hypertensive to 180/102. EKG revealed diffuse ST-depressions in the anterior leads concerning for a posterior wall MI. Cardiology was called and took the patient emergently to the cath lab where it was discovered that he had significant triple-vessel CAD with 90% stenosis of the ostium of the LAD with 99% stenosis in the proximal third of the vessel, likely the culprit lesion. He was placed on a heparin drip and nitro drip for chest pain. Admitted to CCU for monitoring and will be transferred to CTU after evaluation for CABG.      ROS: Denies headache, changes in vision, chest pain, palpitations, shortness of breath, cough, fever, chills, nausea, vomiting, diarrhea, and constipation. (10 Sep 2020 00:57)    Home Medications:    FAMILY HISTORY:  Family history of coronary artery disease (Mother)      PAST MEDICAL & SURGICAL HISTORY:  No pertinent past medical history    H/O shoulder surgery      Interval event for past 24 hr:  BERTO HEIN  50y had no event.   Current Complains:  MELVINABERTO has no new complains  Allergies    No Known Allergies    Intolerances      OBJECTIVE:  Vitals last 24 hrs  T(C): 37.8 (09-12-20 @ 11:00), Max: 37.8 (09-12-20 @ 11:00)  T(F): 100 (09-12-20 @ 11:00), Max: 100 (09-12-20 @ 11:00)  HR: 102 (09-12-20 @ 11:00) (72 - 103)  BP: 99/65 (09-11-20 @ 19:20) (79/51 - 99/65)  ABP: 132/69 (09-12-20 @ 11:00) (66/47 - 138/71)  ABP(mean): 87 (09-12-20 @ 11:00) (53 - 90)  RR: 32 (09-12-20 @ 11:00) (12 - 37)  SpO2: 98% (09-12-20 @ 11:00) (98% - 100%)  CVP(mm Hg): 12 (09-12-20 @ 11:00) (5 - 39)  CI: 3.7 (09-12-20 @ 11:00) (2.7 - 3.7)  PA: 22/13 (09-12-20 @ 11:00) (1/1 - 38/25)  PA(direct): --  PCWP: --  SVR: 907 (09-12-20 @ 11:00) (146 - 1149)  PVR: --    09-11-20 @ 07:01  -  09-12-20 @ 07:00  --------------------------------------------------------  IN:    Albumin 5%  - 500 mL: 1000 mL    Dexmedetomidine: 91.8 mL    Insulin: 28 mL    IV PiggyBack: 300 mL    Nitroglycerin: 9 mL    Norepinephrine: 66.2 mL    Oral Fluid: 60 mL    sodium chloride 0.9%: 280 mL  Total IN: 1835 mL    OUT:    Bulb (mL): 32.5 mL    Chest Tube (mL): 75 mL    Chest Tube (mL): 60 mL    Chest Tube (mL): 580 mL    Heparin: 0 mL    Indwelling Catheter - Urethral (mL): 885 mL    Nitroglycerin: 0 mL  Total OUT: 1632.5 mL    Total NET: 202.5 mL          CAPILLARY BLOOD GLUCOSE      POCT Blood Glucose.: 129 mg/dL (12 Sep 2020 11:06)  POCT Blood Glucose.: 170 mg/dL (12 Sep 2020 09:58)  POCT Blood Glucose.: 143 mg/dL (12 Sep 2020 06:49)  POCT Blood Glucose.: 136 mg/dL (12 Sep 2020 01:00)  POCT Blood Glucose.: 137 mg/dL (11 Sep 2020 22:33)  POCT Blood Glucose.: 107 mg/dL (11 Sep 2020 21:12)  POCT Blood Glucose.: 144 mg/dL (11 Sep 2020 19:48)  POCT Blood Glucose.: 154 mg/dL (11 Sep 2020 18:57)  POCT Blood Glucose.: 149 mg/dL (11 Sep 2020 18:06)  POCT Blood Glucose.: 146 mg/dL (11 Sep 2020 17:12)    LABS:  ABG - ( 12 Sep 2020 04:10 )  pH, Arterial: 7.39  pH, Blood: x     /  pCO2: 39    /  pO2: 133   / HCO3: 24    / Base Excess: -1.2  /  SaO2: 99              Blood Gas Arterial, Lactate: 0.9 mmoL/L (09-12-20 @ 04:10)  Blood Gas Arterial, Lactate: 0.8 mmoL/L (09-12-20 @ 02:01)  Blood Gas Arterial, Lactate: 0.9 mmoL/L (09-12-20 @ 00:12)  Blood Gas Arterial, Lactate: 1.8 mmoL/L <H> (09-11-20 @ 19:51)  Blood Gas Arterial, Lactate: 1.3 mmoL/L (09-11-20 @ 16:40)                          9.2    7.07  )-----------( 100      ( 12 Sep 2020 01:19 )             29.0     Hemoglobin: 9.2 g/dL (09-12 @ 01:19)  Hemoglobin: 9.9 g/dL (09-11 @ 20:10)  Hemoglobin: 10.9 g/dL (09-11 @ 16:55)  Hemoglobin: 13.6 g/dL (09-11 @ 04:30)  Hemoglobin: 13.6 g/dL (09-11 @ 02:00)  Hemoglobin: 13.2 g/dL (09-10 @ 04:22)  Hemoglobin: 15.0 g/dL (09-09 @ 18:20)    09-12    137  |  108  |  15  ----------------------------<  135<H>  4.6   |  22  |  0.8    Ca    8.4<L>      12 Sep 2020 01:19  Mg     1.8     09-12    TPro  5.3<L>  /  Alb  4.3  /  TBili  1.4<H>  /  DBili  x   /  AST  48<H>  /  ALT  21  /  AlkPhos  41  09-12    Creatinine, Serum: 0.8 mg/dL (09-12 @ 01:19)  Creatinine, Serum: 0.8 mg/dL (09-11 @ 16:55)  Creatinine, Serum: 0.8 mg/dL (09-11 @ 04:30)  Creatinine, Serum: 0.8 mg/dL (09-11 @ 02:00)  Creatinine, Serum: 1.0 mg/dL (09-10 @ 04:22)  Creatinine, Serum: 1.1 mg/dL (09-09 @ 18:20)    PT/INR - ( 12 Sep 2020 01:19 )   PT: 17.10 sec;   INR: 1.49 ratio     PTT - ( 12 Sep 2020 01:19 )  PTT:27.9 sec    HOSPITAL MEDICATIONS:  MEDICATIONS  (STANDING):  aspirin 325 milliGRAM(s) Oral daily  atorvastatin 80 milliGRAM(s) Oral at bedtime  ceFAZolin   IVPB 1000 milliGRAM(s) IV Intermittent every 8 hours  dextrose 50% Injectable 50 milliLiter(s) IV Push every 15 minutes  famotidine    Tablet 20 milliGRAM(s) Oral daily  guaiFENesin  milliGRAM(s) Oral every 12 hours  heparin   Injectable 5000 Unit(s) SubCutaneous every 8 hours  insulin regular Infusion 1 Unit(s)/Hr (1 mL/Hr) IV Continuous <Continuous>  ipratropium 17 MICROgram(s) HFA Inhaler 2 Puff(s) Inhalation every 6 hours  isosorbide   dinitrate Tablet (ISORDIL) 5 milliGRAM(s) Oral three times a day  magnesium sulfate  IVPB 1 Gram(s) IV Intermittent every 12 hours  metoprolol tartrate 25 milliGRAM(s) Oral two times a day  nitroglycerin  Infusion 5 MICROgram(s)/Min (1.5 mL/Hr) IV Continuous <Continuous>  polyethylene glycol 3350 17 Gram(s) Oral daily  senna 2 Tablet(s) Oral at bedtime  simethicone 80 milliGRAM(s) Chew every 8 hours  sodium chloride 0.9%. 1000 milliLiter(s) (10 mL/Hr) IV Continuous <Continuous>    MEDICATIONS  (PRN):  fentaNYL    Injectable 25 MICROGram(s) IV Push every 2 hours PRN breakthrough pain  oxyCODONE    IR 10 milliGRAM(s) Oral every 4 hours PRN Severe Pain (7 - 10)  oxyCODONE    IR 5 milliGRAM(s) Oral every 4 hours PRN Moderate Pain (4 - 6)      REVIEW OF SYSTEMS:  CONSTITUTIONAL: [X] all negative; [ ] weakness, [ ] fevers, [ ] chills  EYES/ENT: [X] all negative; [ ] visual changes, [ ] vertigo, [ ] throat pain, [ ] eye pain  NECK: [X] all negative; [ ] pain, [ ] stiffness  RESPIRATORY: [ ] all negative, [x ] cough, [ ] wheezing, [ ] hemoptysis, [x ] shortness of breath, [ x ] chest pain  CARDIOVASCULAR: [ ] all negative; [ ] anginal chest pain, [ ] palpitations, [ ] orthopnea  GASTROINTESTINAL: [X] all negative; [ ]abdominal pain, [ ] nausea, [ ] vomiting, [ ] hematemesis, [ ] diarrhea, [ ] constipation, [ ] melena, [ ] hematochezia.  GENITOURINARY: [X] all negative; [ ] dysuria, [ ] frequency, [ ] hematuria  NEUROLOGICAL: [X] all negative; [ ] numbness, [ ] weakness, [ ] paresthesias  MUSCULOSKELETAL: [X] all negative, [ ] joint pain, [ ] joint swelling, [ ] joint redness, [ ] bone pain  SKIN: [X] all negative; [ ] itching, [ ] burning, [ ] rashes, [ ] lesions   All other review of systems is negative unless indicated above.    [  ] Unable to assess ROS because     PHYSICAL EXAM:          CONSTITUTIONAL: Well-developed; well-nourished; in no acute distress.   	SKIN: warm, dry, no rashes or lesions  	HEENT: Atraumatic. Normocephalic. PERRL. Moist membranes, no conjunctival injection, sclera clear  	NECK: Supple; non tender.  No JVD. No lymphadenopathy.  	CARD: Normal S1, S2. Rate and Rhythm are regular. No murmurs.  	RESP: Good air entry bilaterally, no wheezes, no rales no rhonchi.  	ABD: Soft, not tender, not distended, no CVA tendernass, no rebound no guarding, bowel sounds present  	EXT: Normal ROM.  No clubbing, no cyanosis, no pedal edema, no calf pain b/l, Peripheral pulses intact.  	LYMPH: No acute cervical adenopathy.  	NEURO: Alert, awake, motor 5/5 R, 5/5 L, sensation intact bilat, CN 2-12 intact,          PSYCH: Cooperative, appropriate. Alert & oriented x 3    RADIOLOGY:  X Reviewed and interpreted by me  CxR from 09-12-20 shows mild congestion, no pneumothorax, no effusion, no cardiomegaly,    S-G Catheter in place, Chest Tubes in place,     ECG:  X Reviewed and interpreted by me - NSR

## 2020-09-12 NOTE — PROGRESS NOTE ADULT - SUBJECTIVE AND OBJECTIVE BOX
OPERATIVE PROCEDURE(s):                POD #                       50yMale  SURGEON(s): VEENA Ortega  SUBJECTIVE ASSESSMENT:   Vital Signs Last 24 Hrs  T(F): 99.5 (11 Sep 2020 20:00), Max: 99.5 (11 Sep 2020 20:00)  HR: 97 (12 Sep 2020 10:30) (72 - 103)  BP: 99/65 (11 Sep 2020 19:20) (79/51 - 99/65)  BP(mean): 78 (11 Sep 2020 19:20) (61 - 78)  ABP: 124/68 (12 Sep 2020 10:30) (66/47 - 138/71)  ABP(mean): 84 (12 Sep 2020 10:30)  RR: 25 (12 Sep 2020 10:30) (12 - 37)  SpO2: 99% (12 Sep 2020 10:30) (98% - 100%)  CVP(mm Hg): 11 (12 Sep 2020 10:30)  CVP(cm H2O): --  CO: 5 (11 Sep 2020 21:00)  CI: 2.9 (11 Sep 2020 21:00)  PA: 22/13 (12 Sep 2020 10:30)  SVR: 910 (11 Sep 2020 21:00)  Mode: AC/ CMV (Assist Control/ Continuous Mandatory Ventilation)  RR (machine): 16  TV (machine): 450  FiO2: 40  PEEP: 5  MAP: 9    I&O's Detail    11 Sep 2020 07:01  -  12 Sep 2020 07:00  --------------------------------------------------------  IN:    Albumin 5%  - 500 mL: 1000 mL    Dexmedetomidine: 81.6 mL    Insulin: 19 mL    IV PiggyBack: 150 mL    Nitroglycerin: 9 mL    Norepinephrine: 20.3 mL    Oral Fluid: 60 mL    sodium chloride 0.9%: 120 mL  Total IN: 1459.9 mL    OUT:    Bulb (mL): 22.5 mL    Chest Tube (mL): 440 mL    Chest Tube (mL): 48 mL    Chest Tube (mL): 33 mL    Heparin: 0 mL    Indwelling Catheter - Urethral (mL): 412 mL    Nitroglycerin: 0 mL  Total OUT: 955.5 mL        Net:   I&O's Detail    10 Sep 2020 07:01  -  11 Sep 2020 07:00  --------------------------------------------------------  Total NET: -870.5 mL      11 Sep 2020 07:01  -  12 Sep 2020 07:00  --------------------------------------------------------  Total NET: 504.4 mL        CAPILLARY BLOOD GLUCOSE      POCT Blood Glucose.: 170 mg/dL (12 Sep 2020 09:58)  POCT Blood Glucose.: 143 mg/dL (12 Sep 2020 06:49)  POCT Blood Glucose.: 136 mg/dL (12 Sep 2020 01:00)  POCT Blood Glucose.: 137 mg/dL (11 Sep 2020 22:33)  POCT Blood Glucose.: 107 mg/dL (11 Sep 2020 21:12)  POCT Blood Glucose.: 144 mg/dL (11 Sep 2020 19:48)  POCT Blood Glucose.: 154 mg/dL (11 Sep 2020 18:57)  POCT Blood Glucose.: 149 mg/dL (11 Sep 2020 18:06)  POCT Blood Glucose.: 146 mg/dL (11 Sep 2020 17:12)    Physical Exam:  General: NAD; A&Ox3/Patient is intubated and sedated  Cardiac: S1/S2, RRR, no murmur, no rubs  Lungs: unlabored respirations, CTA b/l, no wheeze, no rales, no crackles  Abdomen: Soft/NT/ND; positive bowel sounds x 4  Sternum: Intact, no click, incision healing well with no drainage  Incisions: Incisions clean/dry/intact  Extremities: No edema b/l lower extremities; good capillary refill; no cyanosis; palpable 1+ pedal pulses b/l    Central Venous Catheter: Yes[]  No[] , If Yes indication:           Day #  Adams Catheter: Yes  [] , No  [] , If yes indication:                      Day #  NGT: Yes [] No [] ,    If Yes Placement:                                     Day #  EPICARDIAL WIRES:  [] YES [] NO                                              Day #  BOWEL MOVEMENT:  [] YES [] NO, If No, Timing since last BM:      Day #  CHEST TUBE(Left/Right):  [] YES [] NO, If yes -  AIR LEAKS:  [] YES [] NO        LABS:                        9.2<L>  7.07  )-----------( 100<L>    ( 12 Sep 2020 01:19 )             29.0<L>                        9.9<L>  8.89  )-----------( 109<L>    ( 11 Sep 2020 20:10 )             31.3<L>        137  |  108  |  15  ----------------------------<  135<H>  4.6   |  22  |  0.8      139  |  108  |  15  ----------------------------<  135<H>  4.0   |  22  |  0.8    Ca    8.4<L>      12 Sep 2020 01:19  Mg     1.8         TPro  5.3<L> [6.0 - 8.0]  /  Alb  4.3 [3.5 - 5.2]  /  TBili  1.4<H> [0.2 - 1.2]  /  DBili  x   /  AST  48<H> [0 - 41]  /  ALT  21 [0 - 41]  /  AlkPhos  41 [30 - 115]  12    PT/INR - ( 12 Sep 2020 01:19 )   PT: ;   INR: 1.49 ratio         PT/INR - ( 11 Sep 2020 19:10 )   PT: ;   INR: 1.35 ratio         PTT - ( 12 Sep 2020 01:19 )  PTT:27.9 sec, PTT - ( 11 Sep 2020 19:10 )  PTT:28.4 sec  Urinalysis Basic - ( 10 Sep 2020 18:55 )    Color: Light Yellow / Appearance: Clear / S.015 / pH: x  Gluc: x / Ketone: Negative  / Bili: Negative / Urobili: <2 mg/dL   Blood: x / Protein: Negative / Nitrite: Negative   Leuk Esterase: Negative / RBC: 11 /HPF / WBC 1 /HPF   Sq Epi: x / Non Sq Epi: 0 /HPF / Bacteria: Negative      ABG - ( 12 Sep 2020 04:10 )  pH: 7.39  /  pCO2: 39    /  pO2: 133   / HCO3: 24    / Base Excess: -1.2  /  SaO2: 99    /  LA: 0.9              RADIOLOGY & ADDITIONAL TESTS:  CXR:  EKG:  MEDICATIONS  (STANDING):  aspirin 325 milliGRAM(s) Oral daily  atorvastatin 80 milliGRAM(s) Oral at bedtime  ceFAZolin   IVPB 1000 milliGRAM(s) IV Intermittent every 8 hours  chlorhexidine 4% Liquid 1 Application(s) Topical once  dextrose 50% Injectable 50 milliLiter(s) IV Push every 15 minutes  famotidine    Tablet 20 milliGRAM(s) Oral daily  guaiFENesin  milliGRAM(s) Oral every 12 hours  heparin   Injectable 5000 Unit(s) SubCutaneous every 8 hours  insulin regular Infusion 1 Unit(s)/Hr (1 mL/Hr) IV Continuous <Continuous>  ipratropium 17 MICROgram(s) HFA Inhaler 2 Puff(s) Inhalation every 6 hours  isosorbide   dinitrate Tablet (ISORDIL) 5 milliGRAM(s) Oral three times a day  magnesium sulfate  IVPB 1 Gram(s) IV Intermittent every 12 hours  metoprolol tartrate 25 milliGRAM(s) Oral two times a day  nitroglycerin  Infusion 5 MICROgram(s)/Min (1.5 mL/Hr) IV Continuous <Continuous>  polyethylene glycol 3350 17 Gram(s) Oral daily  senna 2 Tablet(s) Oral at bedtime  simethicone 80 milliGRAM(s) Chew every 8 hours  sodium chloride 0.9%. 1000 milliLiter(s) (10 mL/Hr) IV Continuous <Continuous>    MEDICATIONS  (PRN):  fentaNYL    Injectable 25 MICROGram(s) IV Push every 2 hours PRN breakthrough pain  oxyCODONE    IR 10 milliGRAM(s) Oral every 4 hours PRN Severe Pain (7 - 10)  oxyCODONE    IR 5 milliGRAM(s) Oral every 4 hours PRN Moderate Pain (4 - 6)    HEPARIN:  [] YES [] NO  Dose: XX UNITS/HR UNITS Q8H  LOVENOX:[] YES [] NO  Dose: XX mg Q24H  COUMADIN: []  YES [] NO  Dose: XX mg  Q24H  SCD's: YES b/l  GI Prophylaxis: Protonix [], Pepcid [], None [], (Contra-indication:.....)    Post-Op Beta-Blockers: Yes [], No[], If No, then contraindication:  Post-Op Aspirin: Yes [],  No [], If No, then contraindication:  Post-Op Statin: Yes [], No[], If No, then contraindication:  Allergies    No Known Allergies    Intolerances      Ambulation/Activity Status:    Assessment/Plan:  50y Male status-post .....  - Case and plan discussed with CTU Intensivist and CT Surgeon - Dr. Salgado/Shannon/Namrata   - Continue CTU supportive care    - Continue DVT/GI prophylaxis  - Incentive Spirometry 10 times an hour  - Continue to advance physical activity as tolerated and continue PT/OT as directed  1. CAD: Continue ASA, statin, BB  2. HTN:   3. A. Fib:   4. COPD/Hypoxia:   5. DM/Glucose Control:     Social Service Disposition:     OPERATIVE PROCEDURE(s):   CBGx4 with left radial harvest             POD #      1                 50yMale  SURGEON(s): VEENA Ortega  SUBJECTIVE ASSESSMENT: pt seen and examined. pt complains of incisional and back pain, otherwise doing well  Vital Signs Last 24 Hrs  T(F): 99.5 (11 Sep 2020 20:00), Max: 99.5 (11 Sep 2020 20:00)  HR: 97 (12 Sep 2020 10:30) (72 - 103)  BP: 99/65 (11 Sep 2020 19:20) (79/51 - 99/65)  BP(mean): 78 (11 Sep 2020 19:20) (61 - 78)  ABP: 124/68 (12 Sep 2020 10:30) (66/47 - 138/71)  ABP(mean): 84 (12 Sep 2020 10:30)  RR: 25 (12 Sep 2020 10:30) (12 - 37)  SpO2: 99% (12 Sep 2020 10:30) (98% - 100%)  CVP(mm Hg): 11 (12 Sep 2020 10:30)  CO: 5 (11 Sep 2020 21:00)  CI: 2.9 (11 Sep 2020 21:00)  PA: 22/13 (12 Sep 2020 10:30)  SVR: 910 (11 Sep 2020 21:00)  Mode: AC/ CMV (Assist Control/ Continuous Mandatory Ventilation)  RR (machine): 16  TV (machine): 450  FiO2: 40  PEEP: 5  MAP: 9    I&O's Detail    11 Sep 2020 07:01  -  12 Sep 2020 07:00  --------------------------------------------------------  IN:    Albumin 5%  - 500 mL: 1000 mL    Dexmedetomidine: 81.6 mL    Insulin: 19 mL    IV PiggyBack: 150 mL    Nitroglycerin: 9 mL    Norepinephrine: 20.3 mL    Oral Fluid: 60 mL    sodium chloride 0.9%: 120 mL  Total IN: 1459.9 mL    OUT:    Bulb (mL): 22.5 mL    Chest Tube (mL): 440 mL    Chest Tube (mL): 48 mL    Chest Tube (mL): 33 mL    Heparin: 0 mL    Indwelling Catheter - Urethral (mL): 412 mL    Nitroglycerin: 0 mL  Total OUT: 955.5 mL        Net:   I&O's Detail    10 Sep 2020 07:01  -  11 Sep 2020 07:00  --------------------------------------------------------  Total NET: -870.5 mL      11 Sep 2020 07:01  -  12 Sep 2020 07:00  --------------------------------------------------------  Total NET: 504.4 mL        CAPILLARY BLOOD GLUCOSE      POCT Blood Glucose.: 170 mg/dL (12 Sep 2020 09:58)  POCT Blood Glucose.: 143 mg/dL (12 Sep 2020 06:49)  POCT Blood Glucose.: 136 mg/dL (12 Sep 2020 01:00)  POCT Blood Glucose.: 137 mg/dL (11 Sep 2020 22:33)  POCT Blood Glucose.: 107 mg/dL (11 Sep 2020 21:12)  POCT Blood Glucose.: 144 mg/dL (11 Sep 2020 19:48)  POCT Blood Glucose.: 154 mg/dL (11 Sep 2020 18:57)  POCT Blood Glucose.: 149 mg/dL (11 Sep 2020 18:06)  POCT Blood Glucose.: 146 mg/dL (11 Sep 2020 17:12)    Physical Exam:  General: NAD; A&Ox3  Cardiac: S1/S2, RRR, no murmur, no rubs  Lungs: decreased bs at bases bilaterally  Abdomen: Soft/NT/ND; positive bowel sounds x 4  Sternum: Intact, no click, incision healing well with no drainage  Incisions: Incisions clean/dry/intact  Extremities: No edema b/l lower extremities; good capillary refill; no cyanosis; palpable 1+ pedal pulses b/l    Central Venous Catheter: Yes[x]  No[] , If Yes indication:   hd unstable        Day #1  Adams Catheter: Yes  [x] , No  [] , If yes indication:    strict i.o                  Day #1  NGT: Yes [] No [x] ,    If Yes Placement:                                     Day #  EPICARDIAL WIRES:  [x] YES [] NO                                              Day #1  BOWEL MOVEMENT:  [] YES [x] NO, If No, Timing since last BM:      Day #  CHEST TUBE(Left/Right):  [x] YES [] NO, If yes -  AIR LEAKS:  [] YES [] NO        LABS:                        9.2<L>  7.07  )-----------( 100<L>    ( 12 Sep 2020 01:19 )             29.0<L>                        9.9<L>  8.89  )-----------( 109<L>    ( 11 Sep 2020 20:10 )             31.3<L>        137  |  108  |  15  ----------------------------<  135<H>  4.6   |  22  |  0.8      139  |  108  |  15  ----------------------------<  135<H>  4.0   |  22  |  0.8    Ca    8.4<L>      12 Sep 2020 01:19  Mg     1.8         TPro  5.3<L> [6.0 - 8.0]  /  Alb  4.3 [3.5 - 5.2]  /  TBili  1.4<H> [0.2 - 1.2]  /  DBili  x   /  AST  48<H> [0 - 41]  /  ALT  21 [0 - 41]  /  AlkPhos  41 [30 - 115]  12    PT/INR - ( 12 Sep 2020 01:19 )   PT: ;   INR: 1.49 ratio         PT/INR - ( 11 Sep 2020 19:10 )   PT: ;   INR: 1.35 ratio         PTT - ( 12 Sep 2020 01:19 )  PTT:27.9 sec, PTT - ( 11 Sep 2020 19:10 )  PTT:28.4 sec  Urinalysis Basic - ( 10 Sep 2020 18:55 )    Color: Light Yellow / Appearance: Clear / S.015 / pH: x  Gluc: x / Ketone: Negative  / Bili: Negative / Urobili: <2 mg/dL   Blood: x / Protein: Negative / Nitrite: Negative   Leuk Esterase: Negative / RBC: 11 /HPF / WBC 1 /HPF   Sq Epi: x / Non Sq Epi: 0 /HPF / Bacteria: Negative      ABG - ( 12 Sep 2020 04:10 )  pH: 7.39  /  pCO2: 39    /  pO2: 133   / HCO3: 24    / Base Excess: -1.2  /  SaO2: 99    /  LA: 0.9              RADIOLOGY & ADDITIONAL TESTS:  CXR: < from: Xray Chest 1 View-PORTABLE IMMEDIATE (Xray Chest 1 View-PORTABLE IMMEDIATE .) (20 @ 17:41) >  Impression:    Tubes and lines as described.    Interval median sternotomy. Pulmonary vascular congestion.    < end of copied text >    EKG:   < from: 12 Lead ECG (20 @ 08:41) >  Ventricular Rate 105 BPM    Atrial Rate 105 BPM    P-R Interval 130 ms    QRS Duration 86 ms    Q-T Interval 340 ms    QTC Calculation(Bazett) 449 ms    P Axis -5 degrees    R Axis 61 degrees    T Axis 7 degrees    Diagnosis Line Sinus tachycardia  Nonspecific ST and T wave abnormality Inferior ischemia Possible Anterolateral  ischemia Possible  Poor R wave progression    < end of copied text >  MEDICATIONS  (STANDING):  aspirin 325 milliGRAM(s) Oral daily  atorvastatin 80 milliGRAM(s) Oral at bedtime  ceFAZolin   IVPB 1000 milliGRAM(s) IV Intermittent every 8 hours  chlorhexidine 4% Liquid 1 Application(s) Topical once  dextrose 50% Injectable 50 milliLiter(s) IV Push every 15 minutes  famotidine    Tablet 20 milliGRAM(s) Oral daily  guaiFENesin  milliGRAM(s) Oral every 12 hours  heparin   Injectable 5000 Unit(s) SubCutaneous every 8 hours  insulin regular Infusion 1 Unit(s)/Hr (1 mL/Hr) IV Continuous <Continuous>  ipratropium 17 MICROgram(s) HFA Inhaler 2 Puff(s) Inhalation every 6 hours  isosorbide   dinitrate Tablet (ISORDIL) 5 milliGRAM(s) Oral three times a day  magnesium sulfate  IVPB 1 Gram(s) IV Intermittent every 12 hours  metoprolol tartrate 25 milliGRAM(s) Oral two times a day  nitroglycerin  Infusion 5 MICROgram(s)/Min (1.5 mL/Hr) IV Continuous <Continuous>  polyethylene glycol 3350 17 Gram(s) Oral daily  senna 2 Tablet(s) Oral at bedtime  simethicone 80 milliGRAM(s) Chew every 8 hours  sodium chloride 0.9%. 1000 milliLiter(s) (10 mL/Hr) IV Continuous <Continuous>    MEDICATIONS  (PRN):  fentaNYL    Injectable 25 MICROGram(s) IV Push every 2 hours PRN breakthrough pain  oxyCODONE    IR 10 milliGRAM(s) Oral every 4 hours PRN Severe Pain (7 - 10)  oxyCODONE    IR 5 milliGRAM(s) Oral every 4 hours PRN Moderate Pain (4 - 6)    HEPARIN:  [x] YES [] NO  Dose: 5000 UNITS Q8H  LOVENOX:[] YES [x] NO  Dose: XX mg Q24H  COUMADIN: []  YES [x] NO  Dose: XX mg  Q24H  SCD's: YES b/l  GI Prophylaxis: Protonix [], Pepcid [x], None [], (Contra-indication:.....)    Post-Op Beta-Blockers: Yes [x], No[], If No, then contraindication:  Post-Op Aspirin: Yes [x],  No [], If No, then contraindication:  Post-Op Statin: Yes [x], No[], If No, then contraindication:  Allergies    No Known Allergies    Intolerances      Ambulation/Activity Status: ambulate     Assessment/Plan:  50y Male status-post CABGx4/ left radial harvest POD#1  - Case and plan discussed with CTU Intensivist and CT Surgeon - Dr. Salgado  - Continue CTU supportive care    - Continue DVT/GI prophylaxis  - Incentive Spirometry 10 times an hour  - Continue to advance physical activity as tolerated and continue PT/OT as directed  1. CAD: Continue ASA, statin, BB, pain control as needed, d/c chest tubes, start isordil to prevent radial vasospasm  2. HTN: cont lopressor, isordil  3. A. Fib prophylaxis: lopressor, mag 1gm bid  4. COPD/Hypoxia:  cont nebs, mucinex, encourage incentive spirometry, wean o2 as tolerated  5. DM/Glucose Control: cont insulin gtt for now    Social Service Disposition:  pt to assess, home most likely

## 2020-09-13 LAB
ALBUMIN SERPL ELPH-MCNC: 4.2 G/DL — SIGNIFICANT CHANGE UP (ref 3.5–5.2)
ALP SERPL-CCNC: 57 U/L — SIGNIFICANT CHANGE UP (ref 30–115)
ALT FLD-CCNC: 20 U/L — SIGNIFICANT CHANGE UP (ref 0–41)
ANION GAP SERPL CALC-SCNC: 6 MMOL/L — LOW (ref 7–14)
APTT BLD: 27.2 SEC — SIGNIFICANT CHANGE UP (ref 27–39.2)
AST SERPL-CCNC: 43 U/L — HIGH (ref 0–41)
BILIRUB SERPL-MCNC: 0.9 MG/DL — SIGNIFICANT CHANGE UP (ref 0.2–1.2)
BUN SERPL-MCNC: 19 MG/DL — SIGNIFICANT CHANGE UP (ref 10–20)
CALCIUM SERPL-MCNC: 8.6 MG/DL — SIGNIFICANT CHANGE UP (ref 8.5–10.1)
CHLORIDE SERPL-SCNC: 101 MMOL/L — SIGNIFICANT CHANGE UP (ref 98–110)
CO2 SERPL-SCNC: 26 MMOL/L — SIGNIFICANT CHANGE UP (ref 17–32)
CREAT SERPL-MCNC: 0.8 MG/DL — SIGNIFICANT CHANGE UP (ref 0.7–1.5)
GLUCOSE BLDC GLUCOMTR-MCNC: 132 MG/DL — HIGH (ref 70–99)
GLUCOSE BLDC GLUCOMTR-MCNC: 133 MG/DL — HIGH (ref 70–99)
GLUCOSE BLDC GLUCOMTR-MCNC: 134 MG/DL — HIGH (ref 70–99)
GLUCOSE BLDC GLUCOMTR-MCNC: 164 MG/DL — HIGH (ref 70–99)
GLUCOSE SERPL-MCNC: 115 MG/DL — HIGH (ref 70–99)
HCT VFR BLD CALC: 30.8 % — LOW (ref 42–52)
HGB BLD-MCNC: 9.8 G/DL — LOW (ref 14–18)
INR BLD: 1.41 RATIO — HIGH (ref 0.65–1.3)
MAGNESIUM SERPL-MCNC: 2 MG/DL — SIGNIFICANT CHANGE UP (ref 1.8–2.4)
MCHC RBC-ENTMCNC: 27.8 PG — SIGNIFICANT CHANGE UP (ref 27–31)
MCHC RBC-ENTMCNC: 31.8 G/DL — LOW (ref 32–37)
MCV RBC AUTO: 87.5 FL — SIGNIFICANT CHANGE UP (ref 80–94)
NRBC # BLD: 0 /100 WBCS — SIGNIFICANT CHANGE UP (ref 0–0)
PLATELET # BLD AUTO: 112 K/UL — LOW (ref 130–400)
POTASSIUM SERPL-MCNC: 4.8 MMOL/L — SIGNIFICANT CHANGE UP (ref 3.5–5)
POTASSIUM SERPL-SCNC: 4.8 MMOL/L — SIGNIFICANT CHANGE UP (ref 3.5–5)
PROT SERPL-MCNC: 5.6 G/DL — LOW (ref 6–8)
PROTHROM AB SERPL-ACNC: 16.2 SEC — HIGH (ref 9.95–12.87)
RBC # BLD: 3.52 M/UL — LOW (ref 4.7–6.1)
RBC # FLD: 12.7 % — SIGNIFICANT CHANGE UP (ref 11.5–14.5)
SODIUM SERPL-SCNC: 133 MMOL/L — LOW (ref 135–146)
WBC # BLD: 8.27 K/UL — SIGNIFICANT CHANGE UP (ref 4.8–10.8)
WBC # FLD AUTO: 8.27 K/UL — SIGNIFICANT CHANGE UP (ref 4.8–10.8)

## 2020-09-13 PROCEDURE — 93010 ELECTROCARDIOGRAM REPORT: CPT

## 2020-09-13 PROCEDURE — 71045 X-RAY EXAM CHEST 1 VIEW: CPT | Mod: 26,76

## 2020-09-13 PROCEDURE — 99233 SBSQ HOSP IP/OBS HIGH 50: CPT

## 2020-09-13 RX ORDER — METOPROLOL TARTRATE 50 MG
25 TABLET ORAL EVERY 8 HOURS
Refills: 0 | Status: DISCONTINUED | OUTPATIENT
Start: 2020-09-13 | End: 2020-09-14

## 2020-09-13 RX ORDER — DEXTROSE 50 % IN WATER 50 %
15 SYRINGE (ML) INTRAVENOUS ONCE
Refills: 0 | Status: DISCONTINUED | OUTPATIENT
Start: 2020-09-13 | End: 2020-09-15

## 2020-09-13 RX ORDER — FUROSEMIDE 40 MG
40 TABLET ORAL ONCE
Refills: 0 | Status: COMPLETED | OUTPATIENT
Start: 2020-09-13 | End: 2020-09-13

## 2020-09-13 RX ORDER — SODIUM CHLORIDE 9 MG/ML
1000 INJECTION, SOLUTION INTRAVENOUS
Refills: 0 | Status: DISCONTINUED | OUTPATIENT
Start: 2020-09-13 | End: 2020-09-15

## 2020-09-13 RX ORDER — GLUCAGON INJECTION, SOLUTION 0.5 MG/.1ML
1 INJECTION, SOLUTION SUBCUTANEOUS ONCE
Refills: 0 | Status: DISCONTINUED | OUTPATIENT
Start: 2020-09-13 | End: 2020-09-15

## 2020-09-13 RX ORDER — INSULIN LISPRO 100/ML
VIAL (ML) SUBCUTANEOUS
Refills: 0 | Status: DISCONTINUED | OUTPATIENT
Start: 2020-09-13 | End: 2020-09-15

## 2020-09-13 RX ADMIN — FENTANYL CITRATE 25 MICROGRAM(S): 50 INJECTION INTRAVENOUS at 02:20

## 2020-09-13 RX ADMIN — Medication 600 MILLIGRAM(S): at 05:52

## 2020-09-13 RX ADMIN — FENTANYL CITRATE 25 MICROGRAM(S): 50 INJECTION INTRAVENOUS at 01:21

## 2020-09-13 RX ADMIN — OXYCODONE HYDROCHLORIDE 10 MILLIGRAM(S): 5 TABLET ORAL at 05:08

## 2020-09-13 RX ADMIN — Medication 40 MILLIGRAM(S): at 09:14

## 2020-09-13 RX ADMIN — ISOSORBIDE DINITRATE 5 MILLIGRAM(S): 5 TABLET ORAL at 14:02

## 2020-09-13 RX ADMIN — OXYCODONE HYDROCHLORIDE 10 MILLIGRAM(S): 5 TABLET ORAL at 10:00

## 2020-09-13 RX ADMIN — Medication 600 MILLIGRAM(S): at 17:41

## 2020-09-13 RX ADMIN — Medication 25 MILLIGRAM(S): at 14:02

## 2020-09-13 RX ADMIN — ISOSORBIDE DINITRATE 5 MILLIGRAM(S): 5 TABLET ORAL at 21:20

## 2020-09-13 RX ADMIN — ATORVASTATIN CALCIUM 80 MILLIGRAM(S): 80 TABLET, FILM COATED ORAL at 21:19

## 2020-09-13 RX ADMIN — Medication 25 MILLIGRAM(S): at 21:20

## 2020-09-13 RX ADMIN — OXYCODONE HYDROCHLORIDE 10 MILLIGRAM(S): 5 TABLET ORAL at 15:00

## 2020-09-13 RX ADMIN — HEPARIN SODIUM 5000 UNIT(S): 5000 INJECTION INTRAVENOUS; SUBCUTANEOUS at 05:52

## 2020-09-13 RX ADMIN — Medication 100 GRAM(S): at 05:56

## 2020-09-13 RX ADMIN — SIMETHICONE 80 MILLIGRAM(S): 80 TABLET, CHEWABLE ORAL at 21:20

## 2020-09-13 RX ADMIN — HEPARIN SODIUM 5000 UNIT(S): 5000 INJECTION INTRAVENOUS; SUBCUTANEOUS at 21:20

## 2020-09-13 RX ADMIN — OXYCODONE HYDROCHLORIDE 10 MILLIGRAM(S): 5 TABLET ORAL at 09:00

## 2020-09-13 RX ADMIN — OXYCODONE HYDROCHLORIDE 10 MILLIGRAM(S): 5 TABLET ORAL at 21:21

## 2020-09-13 RX ADMIN — POLYETHYLENE GLYCOL 3350 17 GRAM(S): 17 POWDER, FOR SOLUTION ORAL at 11:25

## 2020-09-13 RX ADMIN — OXYCODONE HYDROCHLORIDE 10 MILLIGRAM(S): 5 TABLET ORAL at 14:02

## 2020-09-13 RX ADMIN — HEPARIN SODIUM 5000 UNIT(S): 5000 INJECTION INTRAVENOUS; SUBCUTANEOUS at 14:02

## 2020-09-13 RX ADMIN — OXYCODONE HYDROCHLORIDE 10 MILLIGRAM(S): 5 TABLET ORAL at 04:28

## 2020-09-13 RX ADMIN — Medication 325 MILLIGRAM(S): at 11:25

## 2020-09-13 RX ADMIN — ISOSORBIDE DINITRATE 5 MILLIGRAM(S): 5 TABLET ORAL at 05:52

## 2020-09-13 RX ADMIN — CHLORHEXIDINE GLUCONATE 1 APPLICATION(S): 213 SOLUTION TOPICAL at 05:52

## 2020-09-13 RX ADMIN — FAMOTIDINE 20 MILLIGRAM(S): 10 INJECTION INTRAVENOUS at 11:25

## 2020-09-13 RX ADMIN — SENNA PLUS 2 TABLET(S): 8.6 TABLET ORAL at 21:20

## 2020-09-13 RX ADMIN — Medication 100 GRAM(S): at 17:41

## 2020-09-13 RX ADMIN — SIMETHICONE 80 MILLIGRAM(S): 80 TABLET, CHEWABLE ORAL at 05:52

## 2020-09-13 RX ADMIN — SIMETHICONE 80 MILLIGRAM(S): 80 TABLET, CHEWABLE ORAL at 14:03

## 2020-09-13 RX ADMIN — OXYCODONE HYDROCHLORIDE 10 MILLIGRAM(S): 5 TABLET ORAL at 20:07

## 2020-09-13 NOTE — PROGRESS NOTE ADULT - SUBJECTIVE AND OBJECTIVE BOX
Left a message for Sarah to call back for test results. Referral pended.   OPERATIVE PROCEDURE(s):  CABGx4 + left radial harvest              POD #  2                     50yMale  SURGEON(s): VEENA Ortega  SUBJECTIVE ASSESSMENT: pt seen and examined. no acute complaints   Vital Signs Last 24 Hrs  T(F): 98.6 (13 Sep 2020 04:00), Max: 100 (12 Sep 2020 11:00)  HR: 85 (13 Sep 2020 07:00) (76 - 108)  BP: 123/79 (13 Sep 2020 07:00) (100/61 - 139/86)  BP(mean): 96 (13 Sep 2020 07:00) (75 - 106)  ABP: 141/63 (12 Sep 2020 15:15) (113/62 - 141/63)  ABP(mean): 82 (12 Sep 2020 15:15)  RR: 18 (13 Sep 2020 07:00) (6 - 37)  SpO2: 100% (13 Sep 2020 07:00) (95% - 100%)  CVP(mm Hg): 18 (12 Sep 2020 13:15)  CO: 6.6 (12 Sep 2020 13:00)  CI: 3.7 (12 Sep 2020 13:00)  PA: 17/7 (12 Sep 2020 13:15)  SVR: 811 (12 Sep 2020 13:00)    I&O's Detail    12 Sep 2020 07:01  -  13 Sep 2020 07:00  --------------------------------------------------------  IN:    Insulin: 28 mL    IV PiggyBack: 350 mL    Nitroglycerin: 100 mL    Oral Fluid: 960 mL    sodium chloride 0.9%: 220 mL  Total IN: 1658 mL    OUT:    Bulb (mL): 15 mL    Chest Tube (mL): 60 mL    Chest Tube (mL): 15 mL    Chest Tube (mL): 0 mL    Indwelling Catheter - Urethral (mL): 477 mL    Voided (mL): 550 mL  Total OUT: 1117 mL        Net:   I&O's Detail    11 Sep 2020 07:01  -  12 Sep 2020 07:00  --------------------------------------------------------  Total NET: 202.5 mL      12 Sep 2020 07:01  -  13 Sep 2020 07:00  --------------------------------------------------------  Total NET: 541 mL        CAPILLARY BLOOD GLUCOSE      POCT Blood Glucose.: 164 mg/dL (13 Sep 2020 06:57)  POCT Blood Glucose.: 123 mg/dL (12 Sep 2020 21:12)  POCT Blood Glucose.: 103 mg/dL (12 Sep 2020 17:37)  POCT Blood Glucose.: 97 mg/dL (12 Sep 2020 15:48)  POCT Blood Glucose.: 129 mg/dL (12 Sep 2020 11:06)  POCT Blood Glucose.: 170 mg/dL (12 Sep 2020 09:58)    Physical Exam:  General: NAD; A&Ox3/Patient is intubated and sedated  Cardiac: S1/S2, RRR, no murmur, no rubs  Lungs: unlabored respirations, CTA b/l, no wheeze, no rales, no crackles  Abdomen: Soft/NT/ND; positive bowel sounds x 4  Sternum: Intact, no click, incision healing well with no drainage  Incisions: Incisions clean/dry/intact  Extremities: No edema b/l lower extremities; good capillary refill; no cyanosis; palpable 1+ pedal pulses b/l    Central Venous Catheter: Yes[]  No[] , If Yes indication:           Day #  Adams Catheter: Yes  [] , No  [] , If yes indication:                      Day #  NGT: Yes [] No [] ,    If Yes Placement:                                     Day #  EPICARDIAL WIRES:  [] YES [] NO                                              Day #  BOWEL MOVEMENT:  [] YES [] NO, If No, Timing since last BM:      Day #  CHEST TUBE(Left/Right):  [] YES [] NO, If yes -  AIR LEAKS:  [] YES [] NO        LABS:                        9.8<L>  8.27  )-----------( 112<L>    ( 13 Sep 2020 01:30 )             30.8<L>                        9.2<L>  7.07  )-----------( 100<L>    ( 12 Sep 2020 01:19 )             29.0<L>        133<L>  |  101  |  19  ----------------------------<  115<H>  4.8   |  26  |  0.8      137  |  108  |  15  ----------------------------<  135<H>  4.6   |  22  |  0.8    Ca    8.6      13 Sep 2020 01:30  Mg     2.0         TPro  5.6<L> [6.0 - 8.0]  /  Alb  4.2 [3.5 - 5.2]  /  TBili  0.9 [0.2 - 1.2]  /  DBili  x   /  AST  43<H> [0 - 41]  /  ALT  20 [0 - 41]  /  AlkPhos  57 [30 - 115]      PT/INR - ( 13 Sep 2020 01:30 )   PT: ;   INR: 1.41 ratio         PT/INR - ( 12 Sep 2020 01:19 )   PT: ;   INR: 1.49 ratio         PTT - ( 13 Sep 2020 01:30 )  PTT:27.2 sec, PTT - ( 12 Sep 2020 01:19 )  PTT:27.9 sec  Urinalysis Basic - ( 10 Sep 2020 18:55 )    Color: Light Yellow / Appearance: Clear / S.015 / pH: x  Gluc: x / Ketone: Negative  / Bili: Negative / Urobili: <2 mg/dL   Blood: x / Protein: Negative / Nitrite: Negative   Leuk Esterase: Negative / RBC: 11 /HPF / WBC 1 /HPF   Sq Epi: x / Non Sq Epi: 0 /HPF / Bacteria: Negative      ABG - ( 12 Sep 2020 04:10 )  pH: 7.39  /  pCO2: 39    /  pO2: 133   / HCO3: 24    / Base Excess: -1.2  /  SaO2: 99    /  LA: 0.9              RADIOLOGY & ADDITIONAL TESTS:  CXR:  EKG:  MEDICATIONS  (STANDING):  aspirin 325 milliGRAM(s) Oral daily  atorvastatin 80 milliGRAM(s) Oral at bedtime  chlorhexidine 4% Liquid 1 Application(s) Topical <User Schedule>  dextrose 50% Injectable 50 milliLiter(s) IV Push every 15 minutes  famotidine    Tablet 20 milliGRAM(s) Oral daily  guaiFENesin  milliGRAM(s) Oral every 12 hours  heparin   Injectable 5000 Unit(s) SubCutaneous every 8 hours  insulin regular Infusion 1 Unit(s)/Hr (1 mL/Hr) IV Continuous <Continuous>  ipratropium 17 MICROgram(s) HFA Inhaler 2 Puff(s) Inhalation every 6 hours  isosorbide   dinitrate Tablet (ISORDIL) 5 milliGRAM(s) Oral three times a day  magnesium sulfate  IVPB 1 Gram(s) IV Intermittent every 12 hours  metoprolol tartrate 25 milliGRAM(s) Oral two times a day  nitroglycerin  Infusion 5 MICROgram(s)/Min (1.5 mL/Hr) IV Continuous <Continuous>  polyethylene glycol 3350 17 Gram(s) Oral daily  senna 2 Tablet(s) Oral at bedtime  simethicone 80 milliGRAM(s) Chew every 8 hours  sodium chloride 0.9%. 1000 milliLiter(s) (10 mL/Hr) IV Continuous <Continuous>    MEDICATIONS  (PRN):  fentaNYL    Injectable 25 MICROGram(s) IV Push every 2 hours PRN breakthrough pain  oxyCODONE    IR 5 milliGRAM(s) Oral every 4 hours PRN Moderate Pain (4 - 6)  oxyCODONE    IR 10 milliGRAM(s) Oral every 4 hours PRN Severe Pain (7 - 10)    HEPARIN:  [] YES [] NO  Dose: XX UNITS/HR UNITS Q8H  LOVENOX:[] YES [] NO  Dose: XX mg Q24H  COUMADIN: []  YES [] NO  Dose: XX mg  Q24H  SCD's: YES b/l  GI Prophylaxis: Protonix [], Pepcid [], None [], (Contra-indication:.....)    Post-Op Beta-Blockers: Yes [], No[], If No, then contraindication:  Post-Op Aspirin: Yes [],  No [], If No, then contraindication:  Post-Op Statin: Yes [], No[], If No, then contraindication:  Allergies    No Known Allergies    Intolerances      Ambulation/Activity Status:    Assessment/Plan:  50y Male status-post .....  - Case and plan discussed with CTU Intensivist and CT Surgeon - Dr. Salgado/Shannon/Namrata   - Continue CTU supportive care    - Continue DVT/GI prophylaxis  - Incentive Spirometry 10 times an hour  - Continue to advance physical activity as tolerated and continue PT/OT as directed  1. CAD: Continue ASA, statin, BB  2. HTN:   3. A. Fib:   4. COPD/Hypoxia:   5. DM/Glucose Control:     metoprolol 25 q8  cxr later today  lasix  Social Service Disposition:     OPERATIVE PROCEDURE(s):  CABGx4 + left radial harvest              POD #  2                     50yMale  SURGEON(s): VEENA Ortega  SUBJECTIVE ASSESSMENT: pt seen and examined. no acute complaints   Vital Signs Last 24 Hrs  T(F): 98.6 (13 Sep 2020 04:00), Max: 100 (12 Sep 2020 11:00)  HR: 85 (13 Sep 2020 07:00) (76 - 108)  BP: 123/79 (13 Sep 2020 07:00) (100/61 - 139/86)  BP(mean): 96 (13 Sep 2020 07:00) (75 - 106)  ABP: 141/63 (12 Sep 2020 15:15) (113/62 - 141/63)  ABP(mean): 82 (12 Sep 2020 15:15)  RR: 18 (13 Sep 2020 07:00) (6 - 37)  SpO2: 100% (13 Sep 2020 07:00) (95% - 100%)  CVP(mm Hg): 18 (12 Sep 2020 13:15)  CO: 6.6 (12 Sep 2020 13:00)  CI: 3.7 (12 Sep 2020 13:00)  PA: 17/7 (12 Sep 2020 13:15)  SVR: 811 (12 Sep 2020 13:00)    I&O's Detail    12 Sep 2020 07:01  -  13 Sep 2020 07:00  --------------------------------------------------------  IN:    Insulin: 28 mL    IV PiggyBack: 350 mL    Nitroglycerin: 100 mL    Oral Fluid: 960 mL    sodium chloride 0.9%: 220 mL  Total IN: 1658 mL    OUT:    Bulb (mL): 15 mL    Chest Tube (mL): 60 mL    Chest Tube (mL): 15 mL    Chest Tube (mL): 0 mL    Indwelling Catheter - Urethral (mL): 477 mL    Voided (mL): 550 mL  Total OUT: 1117 mL        Net:   I&O's Detail    11 Sep 2020 07:01  -  12 Sep 2020 07:00  --------------------------------------------------------  Total NET: 202.5 mL      12 Sep 2020 07:01  -  13 Sep 2020 07:00  --------------------------------------------------------  Total NET: 541 mL        CAPILLARY BLOOD GLUCOSE      POCT Blood Glucose.: 164 mg/dL (13 Sep 2020 06:57)  POCT Blood Glucose.: 123 mg/dL (12 Sep 2020 21:12)  POCT Blood Glucose.: 103 mg/dL (12 Sep 2020 17:37)  POCT Blood Glucose.: 97 mg/dL (12 Sep 2020 15:48)  POCT Blood Glucose.: 129 mg/dL (12 Sep 2020 11:06)  POCT Blood Glucose.: 170 mg/dL (12 Sep 2020 09:58)      Physical Exam:  General: NAD; A&Ox3  Cardiac: S1/S2, RRR, no murmur, no rubs  Lungs: decreased bs at bases bilaterally  Abdomen: Soft/NT/ND; positive bowel sounds x 4  Sternum: Intact, no click, incision healing well with no drainage  Incisions: Incisions clean/dry/intact  Extremities: No edema b/l lower extremities; good capillary refill; no cyanosis; palpable 1+ pedal pulses b/l    Central Venous Catheter: Yes[x]  No[] , If Yes indication:   hd unstable        Day #2  Adams Catheter: Yes  [] , No  [x] , If yes indication:                    Day #  NGT: Yes [] No [x] ,    If Yes Placement:                                     Day #  EPICARDIAL WIRES:  [x] YES [] NO                                              Day #2  BOWEL MOVEMENT:  [] YES [x] NO, If No, Timing since last BM:      Day #  CHEST TUBE(Left/Right):  [] YES [x] NO, If yes -  AIR LEAKS:  [] YES [] NO        LABS:                        9.8<L>  8.27  )-----------( 112<L>    ( 13 Sep 2020 01:30 )             30.8<L>                        9.2<L>  7.07  )-----------( 100<L>    ( 12 Sep 2020 01:19 )             29.0<L>        133<L>  |  101  |  19  ----------------------------<  115<H>  4.8   |  26  |  0.8      137  |  108  |  15  ----------------------------<  135<H>  4.6   |  22  |  0.8    Ca    8.6      13 Sep 2020 01:30  Mg     2.0         TPro  5.6<L> [6.0 - 8.0]  /  Alb  4.2 [3.5 - 5.2]  /  TBili  0.9 [0.2 - 1.2]  /  DBili  x   /  AST  43<H> [0 - 41]  /  ALT  20 [0 - 41]  /  AlkPhos  57 [30 - 115]      PT/INR - ( 13 Sep 2020 01:30 )   PT: ;   INR: 1.41 ratio         PT/INR - ( 12 Sep 2020 01:19 )   PT: ;   INR: 1.49 ratio         PTT - ( 13 Sep 2020 01:30 )  PTT:27.2 sec, PTT - ( 12 Sep 2020 01:19 )  PTT:27.9 sec  Urinalysis Basic - ( 10 Sep 2020 18:55 )    Color: Light Yellow / Appearance: Clear / S.015 / pH: x  Gluc: x / Ketone: Negative  / Bili: Negative / Urobili: <2 mg/dL   Blood: x / Protein: Negative / Nitrite: Negative   Leuk Esterase: Negative / RBC: 11 /HPF / WBC 1 /HPF   Sq Epi: x / Non Sq Epi: 0 /HPF / Bacteria: Negative      ABG - ( 12 Sep 2020 04:10 )  pH: 7.39  /  pCO2: 39    /  pO2: 133   / HCO3: 24    / Base Excess: -1.2  /  SaO2: 99    /  LA: 0.9              RADIOLOGY & ADDITIONAL TESTS:  CXR: < from: Xray Chest 1 View- PORTABLE-Urgent (Xray Chest 1 View- PORTABLE-Urgent .) (20 @ 14:06) >  Impression:    Small primarily basilar right-sided pneumothorax. Pneumomediastinum. No pleural effusion or parenchymal opacity.    < end of copied text >    EKG: < from:  Lead ECG (20 @ 08:41) >  Ventricular Rate 105 BPM    Atrial Rate 105 BPM    P-R Interval 130 ms    QRS Duration 86 ms    Q-T Interval 340 ms    QTC Calculation(Bazett) 449 ms    P Axis -5 degrees    R Axis 61 degrees    T Axis 7 degrees    Diagnosis Line Sinus tachycardia  Nonspecific ST and T wave abnormality Inferior ischemia Possible Anterolateral  ischemia Possible  Poor R wave progression  Abnormal ECG    < end of copied text >    MEDICATIONS  (STANDING):  aspirin 325 milliGRAM(s) Oral daily  atorvastatin 80 milliGRAM(s) Oral at bedtime  chlorhexidine 4% Liquid 1 Application(s) Topical <User Schedule>  dextrose 50% Injectable 50 milliLiter(s) IV Push every 15 minutes  famotidine    Tablet 20 milliGRAM(s) Oral daily  guaiFENesin  milliGRAM(s) Oral every 12 hours  heparin   Injectable 5000 Unit(s) SubCutaneous every 8 hours  insulin regular Infusion 1 Unit(s)/Hr (1 mL/Hr) IV Continuous <Continuous>  ipratropium 17 MICROgram(s) HFA Inhaler 2 Puff(s) Inhalation every 6 hours  isosorbide   dinitrate Tablet (ISORDIL) 5 milliGRAM(s) Oral three times a day  magnesium sulfate  IVPB 1 Gram(s) IV Intermittent every 12 hours  metoprolol tartrate 25 milliGRAM(s) Oral two times a day  nitroglycerin  Infusion 5 MICROgram(s)/Min (1.5 mL/Hr) IV Continuous <Continuous>  polyethylene glycol 3350 17 Gram(s) Oral daily  senna 2 Tablet(s) Oral at bedtime  simethicone 80 milliGRAM(s) Chew every 8 hours  sodium chloride 0.9%. 1000 milliLiter(s) (10 mL/Hr) IV Continuous <Continuous>    MEDICATIONS  (PRN):  fentaNYL    Injectable 25 MICROGram(s) IV Push every 2 hours PRN breakthrough pain  oxyCODONE    IR 5 milliGRAM(s) Oral every 4 hours PRN Moderate Pain (4 - 6)  oxyCODONE    IR 10 milliGRAM(s) Oral every 4 hours PRN Severe Pain (7 - 10)    HEPARIN:  [x] YES [] NO  Dose: 5000 UNITS Q8H  LOVENOX:[] YES [x] NO  Dose: XX mg Q24H  COUMADIN: []  YES [x] NO  Dose: XX mg  Q24H  SCD's: YES b/l  GI Prophylaxis: Protonix [], Pepcid [x], None [], (Contra-indication:.....)    Post-Op Beta-Blockers: Yes [x], No[], If No, then contraindication:  Post-Op Aspirin: Yes [x],  No [], If No, then contraindication:  Post-Op Statin: Yes [x], No[], If No, then contraindication:  Allergies    No Known Allergies    Intolerances      Ambulation/Activity Status: ambulate    Assessment/Plan:  50y Male status-post CABGx4/ left radial harvest POD#2  - Case and plan discussed with CTU Intensivist and CT Surgeon - Dr. Salgado  - Continue CTU supportive care    - Continue DVT/GI prophylaxis  - Incentive Spirometry 10 times an hour  - Continue to advance physical activity as tolerated and continue PT/OT as directed  1. CAD: Continue ASA, statin, BB, pain control as needed, cont isordil to prevent radial vasospasm  2. HTN: increase lopressor 25mg q8, isordil  3. A. Fib prophylaxis: lopressor, mag 1gm bid  4. COPD/Hypoxia:  cont nebs, mucinex, encourage incentive spirometry, wean o2 as tolerated, lasix fluid overload  5. DM/Glucose Control: cont insulin gtt for now    Social Service Disposition:  home        OPERATIVE PROCEDURE(s):  CABGx4 + left radial harvest              POD #  2                     50yMale  SURGEON(s): VEENA Ortega  SUBJECTIVE ASSESSMENT: pt seen and examined. no acute complaints   Vital Signs Last 24 Hrs  T(F): 98.6 (13 Sep 2020 04:00), Max: 100 (12 Sep 2020 11:00)  HR: 85 (13 Sep 2020 07:00) (76 - 108)  BP: 123/79 (13 Sep 2020 07:00) (100/61 - 139/86)  BP(mean): 96 (13 Sep 2020 07:00) (75 - 106)  ABP: 141/63 (12 Sep 2020 15:15) (113/62 - 141/63)  ABP(mean): 82 (12 Sep 2020 15:15)  RR: 18 (13 Sep 2020 07:00) (6 - 37)  SpO2: 100% (13 Sep 2020 07:00) (95% - 100%)  CVP(mm Hg): 18 (12 Sep 2020 13:15)  CO: 6.6 (12 Sep 2020 13:00)  CI: 3.7 (12 Sep 2020 13:00)  PA: 17/7 (12 Sep 2020 13:15)  SVR: 811 (12 Sep 2020 13:00)    I&O's Detail    12 Sep 2020 07:01  -  13 Sep 2020 07:00  --------------------------------------------------------  IN:    Insulin: 28 mL    IV PiggyBack: 350 mL    Nitroglycerin: 100 mL    Oral Fluid: 960 mL    sodium chloride 0.9%: 220 mL  Total IN: 1658 mL    OUT:    Bulb (mL): 15 mL    Chest Tube (mL): 60 mL    Chest Tube (mL): 15 mL    Chest Tube (mL): 0 mL    Indwelling Catheter - Urethral (mL): 477 mL    Voided (mL): 550 mL  Total OUT: 1117 mL        Net:   I&O's Detail    11 Sep 2020 07:01  -  12 Sep 2020 07:00  --------------------------------------------------------  Total NET: 202.5 mL      12 Sep 2020 07:01  -  13 Sep 2020 07:00  --------------------------------------------------------  Total NET: 541 mL        CAPILLARY BLOOD GLUCOSE      POCT Blood Glucose.: 164 mg/dL (13 Sep 2020 06:57)  POCT Blood Glucose.: 123 mg/dL (12 Sep 2020 21:12)  POCT Blood Glucose.: 103 mg/dL (12 Sep 2020 17:37)  POCT Blood Glucose.: 97 mg/dL (12 Sep 2020 15:48)  POCT Blood Glucose.: 129 mg/dL (12 Sep 2020 11:06)  POCT Blood Glucose.: 170 mg/dL (12 Sep 2020 09:58)      Physical Exam:  General: NAD; A&Ox3  Cardiac: S1/S2, RRR, no murmur, no rubs  Lungs: decreased bs at bases bilaterally  Abdomen: Soft/NT/ND; positive bowel sounds x 4  Sternum: Intact, no click, incision healing well with no drainage  Incisions: Incisions clean/dry/intact  Extremities: No edema b/l lower extremities; good capillary refill; no cyanosis; palpable 1+ pedal pulses b/l    Central Venous Catheter: Yes[x]  No[] , If Yes indication:   hd unstable        Day #2  Adams Catheter: Yes  [] , No  [x] , If yes indication:                    Day #  NGT: Yes [] No [x] ,    If Yes Placement:                                     Day #  EPICARDIAL WIRES:  [x] YES [] NO                                              Day #2  BOWEL MOVEMENT:  [] YES [x] NO, If No, Timing since last BM:      Day #  CHEST TUBE(Left/Right):  [] YES [x] NO, If yes -  AIR LEAKS:  [] YES [] NO        LABS:                        9.8<L>  8.27  )-----------( 112<L>    ( 13 Sep 2020 01:30 )             30.8<L>                        9.2<L>  7.07  )-----------( 100<L>    ( 12 Sep 2020 01:19 )             29.0<L>        133<L>  |  101  |  19  ----------------------------<  115<H>  4.8   |  26  |  0.8      137  |  108  |  15  ----------------------------<  135<H>  4.6   |  22  |  0.8    Ca    8.6      13 Sep 2020 01:30  Mg     2.0         TPro  5.6<L> [6.0 - 8.0]  /  Alb  4.2 [3.5 - 5.2]  /  TBili  0.9 [0.2 - 1.2]  /  DBili  x   /  AST  43<H> [0 - 41]  /  ALT  20 [0 - 41]  /  AlkPhos  57 [30 - 115]      PT/INR - ( 13 Sep 2020 01:30 )   PT: ;   INR: 1.41 ratio         PT/INR - ( 12 Sep 2020 01:19 )   PT: ;   INR: 1.49 ratio         PTT - ( 13 Sep 2020 01:30 )  PTT:27.2 sec, PTT - ( 12 Sep 2020 01:19 )  PTT:27.9 sec  Urinalysis Basic - ( 10 Sep 2020 18:55 )    Color: Light Yellow / Appearance: Clear / S.015 / pH: x  Gluc: x / Ketone: Negative  / Bili: Negative / Urobili: <2 mg/dL   Blood: x / Protein: Negative / Nitrite: Negative   Leuk Esterase: Negative / RBC: 11 /HPF / WBC 1 /HPF   Sq Epi: x / Non Sq Epi: 0 /HPF / Bacteria: Negative      ABG - ( 12 Sep 2020 04:10 )  pH: 7.39  /  pCO2: 39    /  pO2: 133   / HCO3: 24    / Base Excess: -1.2  /  SaO2: 99    /  LA: 0.9              RADIOLOGY & ADDITIONAL TESTS:  CXR: < from: Xray Chest 1 View- PORTABLE-Urgent (Xray Chest 1 View- PORTABLE-Urgent .) (20 @ 14:06) >  Impression:    Small primarily basilar right-sided pneumothorax. Pneumomediastinum. No pleural effusion or parenchymal opacity.    < end of copied text >    EKG: < from:  Lead ECG (20 @ 08:41) >  Ventricular Rate 105 BPM    Atrial Rate 105 BPM    P-R Interval 130 ms    QRS Duration 86 ms    Q-T Interval 340 ms    QTC Calculation(Bazett) 449 ms    P Axis -5 degrees    R Axis 61 degrees    T Axis 7 degrees    Diagnosis Line Sinus tachycardia  Nonspecific ST and T wave abnormality Inferior ischemia Possible Anterolateral  ischemia Possible  Poor R wave progression  Abnormal ECG    < end of copied text >    MEDICATIONS  (STANDING):  aspirin 325 milliGRAM(s) Oral daily  atorvastatin 80 milliGRAM(s) Oral at bedtime  chlorhexidine 4% Liquid 1 Application(s) Topical <User Schedule>  dextrose 50% Injectable 50 milliLiter(s) IV Push every 15 minutes  famotidine    Tablet 20 milliGRAM(s) Oral daily  guaiFENesin  milliGRAM(s) Oral every 12 hours  heparin   Injectable 5000 Unit(s) SubCutaneous every 8 hours  insulin regular Infusion 1 Unit(s)/Hr (1 mL/Hr) IV Continuous <Continuous>  ipratropium 17 MICROgram(s) HFA Inhaler 2 Puff(s) Inhalation every 6 hours  isosorbide   dinitrate Tablet (ISORDIL) 5 milliGRAM(s) Oral three times a day  magnesium sulfate  IVPB 1 Gram(s) IV Intermittent every 12 hours  metoprolol tartrate 25 milliGRAM(s) Oral two times a day  nitroglycerin  Infusion 5 MICROgram(s)/Min (1.5 mL/Hr) IV Continuous <Continuous>  polyethylene glycol 3350 17 Gram(s) Oral daily  senna 2 Tablet(s) Oral at bedtime  simethicone 80 milliGRAM(s) Chew every 8 hours  sodium chloride 0.9%. 1000 milliLiter(s) (10 mL/Hr) IV Continuous <Continuous>    MEDICATIONS  (PRN):  fentaNYL    Injectable 25 MICROGram(s) IV Push every 2 hours PRN breakthrough pain  oxyCODONE    IR 5 milliGRAM(s) Oral every 4 hours PRN Moderate Pain (4 - 6)  oxyCODONE    IR 10 milliGRAM(s) Oral every 4 hours PRN Severe Pain (7 - 10)    HEPARIN:  [x] YES [] NO  Dose: 5000 UNITS Q8H  LOVENOX:[] YES [x] NO  Dose: XX mg Q24H  COUMADIN: []  YES [x] NO  Dose: XX mg  Q24H  SCD's: YES b/l  GI Prophylaxis: Protonix [], Pepcid [x], None [], (Contra-indication:.....)    Post-Op Beta-Blockers: Yes [x], No[], If No, then contraindication:  Post-Op Aspirin: Yes [x],  No [], If No, then contraindication:  Post-Op Statin: Yes [x], No[], If No, then contraindication:  Allergies    No Known Allergies    Intolerances      Ambulation/Activity Status: ambulate    Assessment/Plan:  50y Male status-post CABGx4/ left radial harvest POD#2  - Case and plan discussed with CTU Intensivist and CT Surgeon - Dr. Salgado  - Continue CTU supportive care    - Continue DVT/GI prophylaxis  - Incentive Spirometry 10 times an hour  - Continue to advance physical activity as tolerated and continue PT/OT as directed  1. CAD: Continue ASA, statin, BB, pain control as needed, cont isordil to prevent radial vasospasm  2. HTN: increase lopressor 25mg q8, isordil  3. A. Fib prophylaxis: lopressor, mag 1gm bid  4. COPD/Hypoxia:  cont nebs, mucinex, encourage incentive spirometry, wean o2 as tolerated, lasix fluid overload  5. DM/Glucose Control: d/c insulin gtt, start sliding scale    Social Service Disposition:  home

## 2020-09-13 NOTE — PROGRESS NOTE ADULT - ASSESSMENT
PROBLEMS:  I spent 45 minutes of time examining patient, reviewing vitals, labs, medications, imaging and discussing with the team goals of care to prevent life-threatening in this patient who is at high risk for deterioration or death due to:    CAD - s/p CABG - POD# 2 lopressor 25, isosorbide 5, lipitor 80,   HTN - oN lopressor   Anemia - observe f/u cbc dauily  thrombocytopenia - observe  postthoracotomy pain - add Dilaudid and IV Tylenol  abdominal distention - simethicone 80   R PTX asymptomatic will observe repeat CXR this afternoon place on 100% oxygen for now    PLAN  Neuro: move all 4 extremities. no sensory or motor deficits  Pain management.   aspirin 325 milliGRAM(s) Oral daily  fentaNYL    Injectable 25 MICROGram(s) IV Push every 2 hours PRN  oxyCODONE    IR 10 milliGRAM(s) Oral every 4 hours PRN  oxyCODONE    IR 5 milliGRAM(s) Oral every 4 hours PRN    Pulm: Wean off supplemental oxygen as able. Daily CXR. Encourage coughing, deep breathing and use of incentive spirometry.   guaiFENesin  milliGRAM(s) Oral every 12 hours  ipratropium 17 MICROgram(s) HFA Inhaler 2 Puff(s) Inhalation every 6 hours    Cardio: Monitor telemetry/alarms. Continue supportive care   isosorbide   dinitrate Tablet (ISORDIL) 5 milliGRAM(s) Oral three times a day  metoprolol tartrate 25 milliGRAM(s) Oral two times a day  nitroglycerin  Infusion 5 MICROgram(s)/Min IV Continuous <Continuous>    GI: Continue stool softeners.    famotidine    Tablet 20 milliGRAM(s) Oral daily  polyethylene glycol 3350 17 Gram(s) Oral daily  senna 2 Tablet(s) Oral at bedtime  simethicone 80 milliGRAM(s) Chew every 8 hours    Nutrition: Continue present diet  Endocrine and glucose control:   atorvastatin 80 milliGRAM(s) Oral at bedtime  dextrose 50% Injectable 50 milliLiter(s) IV Push every 15 minutes  insulin regular Infusion 1 Unit(s)/Hr IV Continuous <Continuous>    Renal: monitor urine output, supplement electrolytes as needed,     Vasc: Heparin SC and/or SCDs for DVT prophylaxis  heparin   Injectable 5000 Unit(s) SubCutaneous every 8 hours    ID: Stable, no fever , no chills.   ceFAZolin   IVPB 1000 milliGRAM(s) IV Intermittent every 8 hours      Therapy: OOB/ambulate  Disposition: start planing discharge home or placement    Pertinent clinical, laboratory, radiographic, hemodynamic, echocardiographic, respiratory data, microbiologic data and chart were reviewed and analyzed frequently throughout the course of the day and night. GI and DVT prophylaxis, glycemic control, head of bed elevation and skin care issues were addressed.  Patient seen, examined and plan discussed with CT Surgery / CTICU team during rounds.    [ ] The patient remains in critical and unstable condition, and requires ICU care and monitoring  [x ] The patient is improving but requires continued monitoring and adjustment of therapy

## 2020-09-13 NOTE — PROGRESS NOTE ADULT - SUBJECTIVE AND OBJECTIVE BOX
CTU Attending Progress Daily Note     13 Sep 2020 11:27  POD# - 2  He has history of No pertinent past medical history      Interval event for past 24 hr:  BERTO HEIN  50y had no event.   Current Complains:  BERTO HEIN has no new complains  HPI:  Chief Complaint: Chest Pain       Past Medical History: Triple-vessel CAD      Past Surgical History: None relevant       History of present illness goes back to the day of presentation when the patient experienced the sudden onset of substernal chest pain. The pain is exertional, retrosternal, and radiates down the left arm. At the time of the onset of the pain, the patient was exercising on a treadmill. He stopped exercising and waited for the pain to subside. After an hour, the pain persisted so he decided to come to the ED. He has a strong family history of premature heart disease/CAD as well. He is a non-smoker.       In the ED, the patient was hypertensive to 180/102. EKG revealed diffuse ST-depressions in the anterior leads concerning for a posterior wall MI. Cardiology was called and took the patient emergently to the cath lab where it was discovered that he had significant triple-vessel CAD with 90% stenosis of the ostium of the LAD with 99% stenosis in the proximal third of the vessel, likely the culprit lesion. He was placed on a heparin drip and nitro drip for chest pain. Admitted to CCU for monitoring and will be transferred to CTU after evaluation for CABG.      ROS: Denies headache, changes in vision, chest pain, palpitations, shortness of breath, cough, fever, chills, nausea, vomiting, diarrhea, and constipation. (10 Sep 2020 00:57)    OBJECTIVE:  ICU Vital Signs Last 24 Hrs  T(C): 36.7 (13 Sep 2020 08:00), Max: 37.8 (12 Sep 2020 16:00)  T(F): 98.1 (13 Sep 2020 08:00), Max: 100 (12 Sep 2020 16:00)  HR: 98 (13 Sep 2020 10:00) (76 - 108)  BP: 127/81 (13 Sep 2020 10:00) (100/61 - 145/86)  BP(mean): 98 (13 Sep 2020 10:00) (75 - 108)  ABP: 141/63 (12 Sep 2020 15:15) (113/62 - 141/63)  ABP(mean): 82 (12 Sep 2020 15:15) (77 - 85)  RR: 21 (13 Sep 2020 10:00) (6 - 32)  SpO2: 100% (13 Sep 2020 10:00) (95% - 100%)    I&O's Summary    12 Sep 2020 07:01  -  13 Sep 2020 07:00  --------------------------------------------------------  IN: 1658 mL / OUT: 1117 mL / NET: 541 mL    13 Sep 2020 07:01  -  13 Sep 2020 11:27  --------------------------------------------------------  IN: 120 mL / OUT: 1100 mL / NET: -980 mL      I&O's Detail    12 Sep 2020 07:01  -  13 Sep 2020 07:00  --------------------------------------------------------  IN:    Insulin: 28 mL    IV PiggyBack: 350 mL    Nitroglycerin: 100 mL    Oral Fluid: 960 mL    sodium chloride 0.9%: 220 mL  Total IN: 1658 mL    OUT:    Bulb (mL): 15 mL    Chest Tube (mL): 60 mL    Chest Tube (mL): 15 mL    Chest Tube (mL): 0 mL    Indwelling Catheter - Urethral (mL): 477 mL    Voided (mL): 550 mL  Total OUT: 1117 mL    Total NET: 541 mL      13 Sep 2020 07:01  -  13 Sep 2020 11:27  --------------------------------------------------------  IN:    Oral Fluid: 120 mL  Total IN: 120 mL    OUT:    Voided (mL): 1100 mL  Total OUT: 1100 mL    Total NET: -980 mL        Adult Advanced Hemodynamics Last 24 Hrs  CVP(mm Hg): 18 (12 Sep 2020 13:15) (9 - 18)  CVP(cm H2O): --  CO: 6.6 (12 Sep 2020 13:00) (6.6 - 6.6)  CI: 3.7 (12 Sep 2020 13:00) (3.7 - 3.7)  PA: 17/7 (12 Sep 2020 13:15) (17/7 - 26/14)  PA(mean): 12 (12 Sep 2020 13:15) (12 - 20)  PCWP: --  SVR: 811 (12 Sep 2020 13:00) (811 - 811)  SVRI: 1446 (12 Sep 2020 13:00) (1446 - 1446)  PVR: --  PVRI: --    CAPILLARY BLOOD GLUCOSE      POCT Blood Glucose.: 164 mg/dL (13 Sep 2020 06:57)  POCT Blood Glucose.: 123 mg/dL (12 Sep 2020 21:12)  POCT Blood Glucose.: 103 mg/dL (12 Sep 2020 17:37)  POCT Blood Glucose.: 97 mg/dL (12 Sep 2020 15:48)    LABS:  ABG - ( 12 Sep 2020 04:10 )  pH, Arterial: 7.39  pH, Blood: x     /  pCO2: 39    /  pO2: 133   / HCO3: 24    / Base Excess: -1.2  /  SaO2: 99                                      9.8    8.27  )-----------( 112      ( 13 Sep 2020 01:30 )             30.8     09-13    133<L>  |  101  |  19  ----------------------------<  115<H>  4.8   |  26  |  0.8    Ca    8.6      13 Sep 2020 01:30  Mg     2.0     09-13    TPro  5.6<L>  /  Alb  4.2  /  TBili  0.9  /  DBili  x   /  AST  43<H>  /  ALT  20  /  AlkPhos  57  09-13    PT/INR - ( 13 Sep 2020 01:30 )   PT: 16.20 sec;   INR: 1.41 ratio         PTT - ( 13 Sep 2020 01:30 )  PTT:27.2 sec      Home Medications:    HOSPITAL MEDICATIONS:  MEDICATIONS  (STANDING):  aspirin 325 milliGRAM(s) Oral daily  atorvastatin 80 milliGRAM(s) Oral at bedtime  chlorhexidine 4% Liquid 1 Application(s) Topical <User Schedule>  dextrose 5%. 1000 milliLiter(s) (50 mL/Hr) IV Continuous <Continuous>  dextrose 50% Injectable 50 milliLiter(s) IV Push every 15 minutes  famotidine    Tablet 20 milliGRAM(s) Oral daily  guaiFENesin  milliGRAM(s) Oral every 12 hours  heparin   Injectable 5000 Unit(s) SubCutaneous every 8 hours  insulin lispro (HumaLOG) corrective regimen sliding scale   SubCutaneous three times a day before meals  ipratropium 17 MICROgram(s) HFA Inhaler 2 Puff(s) Inhalation every 6 hours  isosorbide   dinitrate Tablet (ISORDIL) 5 milliGRAM(s) Oral three times a day  magnesium sulfate  IVPB 1 Gram(s) IV Intermittent every 12 hours  metoprolol tartrate 25 milliGRAM(s) Oral every 8 hours  nitroglycerin  Infusion 5 MICROgram(s)/Min (1.5 mL/Hr) IV Continuous <Continuous>  polyethylene glycol 3350 17 Gram(s) Oral daily  senna 2 Tablet(s) Oral at bedtime  simethicone 80 milliGRAM(s) Chew every 8 hours  sodium chloride 0.9%. 1000 milliLiter(s) (10 mL/Hr) IV Continuous <Continuous>    MEDICATIONS  (PRN):  dextrose 40% Gel 15 Gram(s) Oral once PRN Blood Glucose LESS THAN 70 milliGRAM(s)/deciliter  fentaNYL    Injectable 25 MICROGram(s) IV Push every 2 hours PRN breakthrough pain  glucagon  Injectable 1 milliGRAM(s) IntraMuscular once PRN Glucose LESS THAN 70 milligrams/deciliter  oxyCODONE    IR 10 milliGRAM(s) Oral every 4 hours PRN Severe Pain (7 - 10)  oxyCODONE    IR 5 milliGRAM(s) Oral every 4 hours PRN Moderate Pain (4 - 6)      REVIEW OF SYSTEMS:  CONSTITUTIONAL: [X] all negative; [ ] weakness, [ ] fevers, [ ] chills  EYES/ENT: [X] all negative; [ ] visual changes, [ ] vertigo, [ ] throat pain   NECK: [X] all negative; [ ] pain, [ ] stiffness  RESPIRATORY: [] all negative, [ ] cough, [ ] wheezing, [ ] hemoptysis, [ ] shortness of breath  CARDIOVASCULAR: [] all negative; [ ] chest pain, [ ] palpitations, [ ] orthopnea  GASTROINTESTINAL: [X] all negative; [ ]abdominal pain, [ ] nausea, [ ] vomiting, [ ] hematemesis, [ ] diarrhea, [ ] constipation, [ ] melena, [ ] hematochezia.  GENITOURINARY: [X] all negative; [ ] dysuria, [ ] frequency, [ ] hematuria  NEUROLOGICAL: [X] all negative; [ ] numbness, [ ] weakness  SKIN: [X] all negative; [ ] itching, [ ] burning, [ ] rashes, [ ] lesions   All other review of systems is negative unless indicated above.    [  ] Unable to assess ROS because     PHYSICAL EXAM:          CONSTITUTIONAL: Well-developed; well-nourished; in no acute distress.   	SKIN: warm, dry  	HEAD: Normocephalic; atraumatic.  	EYES: PERRL, EOM, no conj injection, sclera clear  	ENT: No nasal discharge; airway clear.  	NECK: Supple; non tender.  No midline ttp ctls  	CARD: S1, S2 normal; no murmurs, gallops, or rubs. Regular rate and rhythm. 2+ RPs and DPs bilat, no carotid bruits, no pedal   edema, no calf pain b/l  	RESP: CTA  bilat good air movement No wheezes, rales or rhonchi.  	ABD: Soft, not tender, not distended, no CVA ttp no rebound or guarding, bowel sounds present  	EXT: Normal ROM.  No clubbing, cyanosis or edema.   	  	NEURO: Alert, awake, motor 5/5 R, 5/5 L        RADIOLOGY:  xray  < from: Xray Chest 1 View- PORTABLE-Urgent (Xray Chest 1 View- PORTABLE-Urgent .) (09.12.20 @ 14:06) >  Impression:    Small primarily basilar right-sided pneumothorax. Pneumomediastinum. No pleural effusion or parenchymal opacity.    < end of copied text >  ray  I spent 45 minutes of critical care time examining patient, reviewing vitals, labs, medications, imaging and discussing with the team goals of care to prevent life-threatening in this patient who is at high risk for deterioration or death due to:

## 2020-09-14 ENCOUNTER — TRANSCRIPTION ENCOUNTER (OUTPATIENT)
Age: 50
End: 2020-09-14

## 2020-09-14 LAB
ANION GAP SERPL CALC-SCNC: 6 MMOL/L — LOW (ref 7–14)
BUN SERPL-MCNC: 25 MG/DL — HIGH (ref 10–20)
CALCIUM SERPL-MCNC: 8.3 MG/DL — LOW (ref 8.5–10.1)
CHLORIDE SERPL-SCNC: 99 MMOL/L — SIGNIFICANT CHANGE UP (ref 98–110)
CO2 SERPL-SCNC: 31 MMOL/L — SIGNIFICANT CHANGE UP (ref 17–32)
CREAT SERPL-MCNC: 1 MG/DL — SIGNIFICANT CHANGE UP (ref 0.7–1.5)
GLUCOSE BLDC GLUCOMTR-MCNC: 115 MG/DL — HIGH (ref 70–99)
GLUCOSE BLDC GLUCOMTR-MCNC: 132 MG/DL — HIGH (ref 70–99)
GLUCOSE BLDC GLUCOMTR-MCNC: 138 MG/DL — HIGH (ref 70–99)
GLUCOSE BLDC GLUCOMTR-MCNC: 151 MG/DL — HIGH (ref 70–99)
GLUCOSE SERPL-MCNC: 120 MG/DL — HIGH (ref 70–99)
HCT VFR BLD CALC: 29.8 % — LOW (ref 42–52)
HGB BLD-MCNC: 9.4 G/DL — LOW (ref 14–18)
MAGNESIUM SERPL-MCNC: 2 MG/DL — SIGNIFICANT CHANGE UP (ref 1.8–2.4)
MCHC RBC-ENTMCNC: 26.9 PG — LOW (ref 27–31)
MCHC RBC-ENTMCNC: 31.5 G/DL — LOW (ref 32–37)
MCV RBC AUTO: 85.4 FL — SIGNIFICANT CHANGE UP (ref 80–94)
NRBC # BLD: 0 /100 WBCS — SIGNIFICANT CHANGE UP (ref 0–0)
PLATELET # BLD AUTO: 123 K/UL — LOW (ref 130–400)
POTASSIUM SERPL-MCNC: 4.1 MMOL/L — SIGNIFICANT CHANGE UP (ref 3.5–5)
POTASSIUM SERPL-SCNC: 4.1 MMOL/L — SIGNIFICANT CHANGE UP (ref 3.5–5)
RBC # BLD: 3.49 M/UL — LOW (ref 4.7–6.1)
RBC # FLD: 12.5 % — SIGNIFICANT CHANGE UP (ref 11.5–14.5)
SODIUM SERPL-SCNC: 136 MMOL/L — SIGNIFICANT CHANGE UP (ref 135–146)
WBC # BLD: 7.38 K/UL — SIGNIFICANT CHANGE UP (ref 4.8–10.8)
WBC # FLD AUTO: 7.38 K/UL — SIGNIFICANT CHANGE UP (ref 4.8–10.8)

## 2020-09-14 PROCEDURE — 71045 X-RAY EXAM CHEST 1 VIEW: CPT | Mod: 26

## 2020-09-14 PROCEDURE — 99233 SBSQ HOSP IP/OBS HIGH 50: CPT

## 2020-09-14 PROCEDURE — 93010 ELECTROCARDIOGRAM REPORT: CPT

## 2020-09-14 RX ORDER — METOPROLOL TARTRATE 50 MG
12.5 TABLET ORAL EVERY 8 HOURS
Refills: 0 | Status: DISCONTINUED | OUTPATIENT
Start: 2020-09-14 | End: 2020-09-15

## 2020-09-14 RX ORDER — ISOSORBIDE DINITRATE 5 MG/1
5 TABLET ORAL THREE TIMES A DAY
Refills: 0 | Status: COMPLETED | OUTPATIENT
Start: 2020-09-14 | End: 2020-09-14

## 2020-09-14 RX ORDER — ISOSORBIDE MONONITRATE 60 MG/1
15 TABLET, EXTENDED RELEASE ORAL DAILY
Refills: 0 | Status: DISCONTINUED | OUTPATIENT
Start: 2020-09-15 | End: 2020-09-15

## 2020-09-14 RX ADMIN — OXYCODONE HYDROCHLORIDE 10 MILLIGRAM(S): 5 TABLET ORAL at 05:08

## 2020-09-14 RX ADMIN — Medication 1: at 07:49

## 2020-09-14 RX ADMIN — Medication 25 MILLIGRAM(S): at 05:08

## 2020-09-14 RX ADMIN — HEPARIN SODIUM 5000 UNIT(S): 5000 INJECTION INTRAVENOUS; SUBCUTANEOUS at 13:56

## 2020-09-14 RX ADMIN — Medication 325 MILLIGRAM(S): at 11:48

## 2020-09-14 RX ADMIN — HEPARIN SODIUM 5000 UNIT(S): 5000 INJECTION INTRAVENOUS; SUBCUTANEOUS at 05:09

## 2020-09-14 RX ADMIN — Medication 12.5 MILLIGRAM(S): at 22:19

## 2020-09-14 RX ADMIN — POLYETHYLENE GLYCOL 3350 17 GRAM(S): 17 POWDER, FOR SOLUTION ORAL at 11:48

## 2020-09-14 RX ADMIN — OXYCODONE HYDROCHLORIDE 10 MILLIGRAM(S): 5 TABLET ORAL at 12:30

## 2020-09-14 RX ADMIN — SIMETHICONE 80 MILLIGRAM(S): 80 TABLET, CHEWABLE ORAL at 22:19

## 2020-09-14 RX ADMIN — SENNA PLUS 2 TABLET(S): 8.6 TABLET ORAL at 22:19

## 2020-09-14 RX ADMIN — OXYCODONE HYDROCHLORIDE 10 MILLIGRAM(S): 5 TABLET ORAL at 20:10

## 2020-09-14 RX ADMIN — ISOSORBIDE DINITRATE 5 MILLIGRAM(S): 5 TABLET ORAL at 13:55

## 2020-09-14 RX ADMIN — Medication 600 MILLIGRAM(S): at 05:08

## 2020-09-14 RX ADMIN — ISOSORBIDE DINITRATE 5 MILLIGRAM(S): 5 TABLET ORAL at 22:19

## 2020-09-14 RX ADMIN — SIMETHICONE 80 MILLIGRAM(S): 80 TABLET, CHEWABLE ORAL at 13:55

## 2020-09-14 RX ADMIN — OXYCODONE HYDROCHLORIDE 10 MILLIGRAM(S): 5 TABLET ORAL at 05:09

## 2020-09-14 RX ADMIN — HEPARIN SODIUM 5000 UNIT(S): 5000 INJECTION INTRAVENOUS; SUBCUTANEOUS at 22:20

## 2020-09-14 RX ADMIN — Medication 100 GRAM(S): at 17:30

## 2020-09-14 RX ADMIN — Medication 600 MILLIGRAM(S): at 17:29

## 2020-09-14 RX ADMIN — OXYCODONE HYDROCHLORIDE 10 MILLIGRAM(S): 5 TABLET ORAL at 11:49

## 2020-09-14 RX ADMIN — FAMOTIDINE 20 MILLIGRAM(S): 10 INJECTION INTRAVENOUS at 11:48

## 2020-09-14 RX ADMIN — Medication 100 GRAM(S): at 05:09

## 2020-09-14 RX ADMIN — SIMETHICONE 80 MILLIGRAM(S): 80 TABLET, CHEWABLE ORAL at 05:08

## 2020-09-14 RX ADMIN — ISOSORBIDE DINITRATE 5 MILLIGRAM(S): 5 TABLET ORAL at 05:08

## 2020-09-14 RX ADMIN — ATORVASTATIN CALCIUM 80 MILLIGRAM(S): 80 TABLET, FILM COATED ORAL at 22:19

## 2020-09-14 RX ADMIN — OXYCODONE HYDROCHLORIDE 10 MILLIGRAM(S): 5 TABLET ORAL at 21:00

## 2020-09-14 NOTE — DISCHARGE NOTE NURSING/CASE MANAGEMENT/SOCIAL WORK - PATIENT PORTAL LINK FT
You can access the FollowMyHealth Patient Portal offered by [facility] by registering at the following website: [email or web address]. By joining [facility] FollowMyHealth portal, you will also be able to view your health information using other applications (apps) compatible with our system.

## 2020-09-14 NOTE — PROGRESS NOTE ADULT - SUBJECTIVE AND OBJECTIVE BOX
CTU Attending Progress Daily Note     14 Sep 2020 07:42    Procedure:                                                  POD#                   Patient seen as post-op critical care follow-up    HPI:  Chief Complaint: Chest Pain       Past Medical History: Triple-vessel CAD      Past Surgical History: None relevant       History of present illness goes back to the day of presentation when the patient experienced the sudden onset of substernal chest pain. The pain is exertional, retrosternal, and radiates down the left arm. At the time of the onset of the pain, the patient was exercising on a treadmill. He stopped exercising and waited for the pain to subside. After an hour, the pain persisted so he decided to come to the ED. He has a strong family history of premature heart disease/CAD as well. He is a non-smoker.       In the ED, the patient was hypertensive to 180/102. EKG revealed diffuse ST-depressions in the anterior leads concerning for a posterior wall MI. Cardiology was called and took the patient emergently to the cath lab where it was discovered that he had significant triple-vessel CAD with 90% stenosis of the ostium of the LAD with 99% stenosis in the proximal third of the vessel, likely the culprit lesion. He was placed on a heparin drip and nitro drip for chest pain. Admitted to CCU for monitoring and will be transferred to CTU after evaluation for CABG.      ROS: Denies headache, changes in vision, chest pain, palpitations, shortness of breath, cough, fever, chills, nausea, vomiting, diarrhea, and constipation. (10 Sep 2020 00:57)    See preop testing chart H&P    Interval event for past 24 hr:  BERTO HEIN  50y had no event.     Current Complains:  BERTO HEIN has no new complaints    REVIEW OF SYSTEMS:  CONSTITUTIONAL:  [-] weakness, [-] fevers, [-] chills  EYES/ENT: [-] visual changes, [-] vertigo, [-] throat pain   NECK: [-] pain, [-] stiffness  RESPIRATORY: [-] cough, [-] wheezing, [-] hemoptysis, [-] shortness of breath  CARDIOVASCULAR: [-] chest pain, [-] palpitations, [-] orthopnea  GASTROINTESTINAL:    [-]abdominal pain, [-] nausea, [-] vomiting, [-] hematemesis, [-] diarrhea, [-] constipation, [-] melena, [-] hematochezia.  GENITOURINARY: [-] dysuria, [-] frequency, [-] hematuria  NEUROLOGICAL: [-] numbness, [-] weakness  SKIN: [-] itching, [-] burning, [-] rashes, [-] lesions   All other review of systems is negative unless indicated above.    [  ] Unable to assess ROS because :    OBJECTIVE:  ICU Vital Signs Last 24 Hrs  T(C): 37.1 (14 Sep 2020 06:00), Max: 37.2 (13 Sep 2020 20:00)  T(F): 98.8 (14 Sep 2020 06:00), Max: 99 (13 Sep 2020 20:00)  HR: 81 (14 Sep 2020 07:00) (76 - 121)  BP: 97/64 (14 Sep 2020 07:00) (95/66 - 145/86)  BP(mean): 75 (14 Sep 2020 07:00) (72 - 108)  ABP: --  ABP(mean): --  RR: 19 (14 Sep 2020 07:00) (7 - 27)  SpO2: 99% (14 Sep 2020 07:00) (92% - 100%)      I&O's Summary    13 Sep 2020 07:01  -  14 Sep 2020 07:00  --------------------------------------------------------  IN: 720 mL / OUT: 2525 mL / NET: -1805 mL      Adult Advanced Hemodynamics Last 24 Hrs  CVP(mm Hg): --  CVP(cm H2O): --  CO: --  CI: --  PA: --  PA(mean): --  PCWP: --  SVR: --  SVRI: --  PVR: --  PVRI: --      PHYSICAL EXAM:  General: WN/WD NAD    HEENT:     [+] NCAT  [+] EOMI  [-] Conjuctival edema   [-] Icterus   [-] Thrush   [-] ETT  [-] NGT/OGT    Neck:         [+] FROM   [-] JVD     [-] Nodes     [-] Masses    [+] Mid-line trachea    [-] Tracheostomy    Chest:         [-] Sternal click   [-] Sternal drainage   [+] Pacing wires   [+] Chest tubes   [-] SubQ emphysema    Lungs:          [+] CTA   [-] Rhonchi   [-] Rales    [-] Wheezing    [-] Decreased BS    [-] Dullness R L    Cardiac:       [+] S1 [+] S2    [+] RRR   [-] Irregular   [-] S3   [-] S4    [-] Murmurs    [-] Rub    Abdomen:    [+] BS    [+] Soft    [+] Non-tender     [-] Distended    [-] Organomegaly  [-] PEG    Extremities:   [-] Cyanosis U/L   [-] Clubbing  U/L  [-] LE/UE Edema   [+] Capillary refill    [+] Pulses     Neuro:        [+] Awake   [+]  Alert   [-] Confused   [-] Lethargic   [-] Sedated   [-] Generalized Weakness    Skin:        [-] Rashes    [-] Erythema   [+] Normal incisions   [+] IV sites intact   [-] Sacral decubitus    Tubes:  LINES:    CAPILLARY BLOOD GLUCOSE      POCT Blood Glucose.: 151 mg/dL (14 Sep 2020 06:42)    CAPILLARY BLOOD GLUCOSE      POCT Blood Glucose.: 151 mg/dL (14 Sep 2020 06:42)  POCT Blood Glucose.: 133 mg/dL (13 Sep 2020 22:27)  POCT Blood Glucose.: 132 mg/dL (13 Sep 2020 16:32)  POCT Blood Glucose.: 134 mg/dL (13 Sep 2020 11:29)      HOSPITAL MEDICATIONS:  MEDICATIONS  (STANDING):  aspirin 325 milliGRAM(s) Oral daily  atorvastatin 80 milliGRAM(s) Oral at bedtime  chlorhexidine 4% Liquid 1 Application(s) Topical <User Schedule>  dextrose 5%. 1000 milliLiter(s) (50 mL/Hr) IV Continuous <Continuous>  dextrose 50% Injectable 50 milliLiter(s) IV Push every 15 minutes  famotidine    Tablet 20 milliGRAM(s) Oral daily  guaiFENesin  milliGRAM(s) Oral every 12 hours  heparin   Injectable 5000 Unit(s) SubCutaneous every 8 hours  insulin lispro (HumaLOG) corrective regimen sliding scale   SubCutaneous three times a day before meals  ipratropium 17 MICROgram(s) HFA Inhaler 2 Puff(s) Inhalation every 6 hours  isosorbide   dinitrate Tablet (ISORDIL) 5 milliGRAM(s) Oral three times a day  magnesium sulfate  IVPB 1 Gram(s) IV Intermittent every 12 hours  metoprolol tartrate 25 milliGRAM(s) Oral every 8 hours  nitroglycerin  Infusion 5 MICROgram(s)/Min (1.5 mL/Hr) IV Continuous <Continuous>  polyethylene glycol 3350 17 Gram(s) Oral daily  senna 2 Tablet(s) Oral at bedtime  simethicone 80 milliGRAM(s) Chew every 8 hours  sodium chloride 0.9%. 1000 milliLiter(s) (10 mL/Hr) IV Continuous <Continuous>    MEDICATIONS  (PRN):  dextrose 40% Gel 15 Gram(s) Oral once PRN Blood Glucose LESS THAN 70 milliGRAM(s)/deciliter  fentaNYL    Injectable 25 MICROGram(s) IV Push every 2 hours PRN breakthrough pain  glucagon  Injectable 1 milliGRAM(s) IntraMuscular once PRN Glucose LESS THAN 70 milligrams/deciliter  oxyCODONE    IR 10 milliGRAM(s) Oral every 4 hours PRN Severe Pain (7 - 10)  oxyCODONE    IR 5 milliGRAM(s) Oral every 4 hours PRN Moderate Pain (4 - 6)      LABS:                          9.4    7.38  )-----------( 123      ( 14 Sep 2020 01:30 )             29.8     09-14    136  |  99  |  25<H>  ----------------------------<  120<H>  4.1   |  31  |  1.0    Ca    8.3<L>      14 Sep 2020 01:30  Mg     2.0     09-14    TPro  5.6<L>  /  Alb  4.2  /  TBili  0.9  /  DBili  x   /  AST  43<H>  /  ALT  20  /  AlkPhos  57  09-13    PT/INR - ( 13 Sep 2020 01:30 )   PT: 16.20 sec;   INR: 1.41 ratio         PTT - ( 13 Sep 2020 01:30 )  PTT:27.2 sec        RADIOLOGY:  Reviewed and interpreted by me  CXR from 09-14-20 shows [+] mild congestion, [-] pneumothorax, [-] R/L effusion, [-] cardiomegaly,   NGT in place, S-G Catheter in place, R/L TLC in place, R/L Chest Tubes in place    ECG:  Reviewed and interpreted by me:   QTC:    Assessment:      PAST MEDICAL & SURGICAL HISTORY:  No pertinent past medical history    H/O shoulder surgery        PLAN:  Neuro: Pain control  Pulm: Encourage coughing, deep breathing and use of incentive spirometry. Wean off supplemental oxygen as able. Daily CXR.   Cardio: Monitor telemetry/alarms. Continue cardiac meds  GI: Tolerating diet. Continue stool softeners. Continue GI prophylaxis  Renal: monitor urine output, supplement electrolytes as needed  Vasc: Heparin SC/SCDs for DVT prophylaxis  Heme: Monitor H/H.   ID: Off antibiotics. Stable.  Endocrine: Monitor finger stick blood sugar and control hyperglycemia with insulin  Physical Therapy: OOB/ambulate  Tubes: Monitor Chest tube output      Discussed with Cardiothoracic Team at AM rounds.    45 minutes of critical care time spent providing medical care for patient's acute illness/conditions that impairs at least one vital organ system and/or poses a high risk of imminent or life threatening deterioration in the patient's condition. It includes time spent evaluating and treating the patient's acute illness as well as time spent reviewing labs, radiology, discussing goals of care with patient and/or patient's family, and discussing the case with a multidisciplinary team in an effort to prevent further life threatening deterioration or end organ damage. This time is independent of any procedures performed. CTU Attending Progress Daily Note     14 Sep 2020 07:42    Procedure:                 CABG                                 POD#           3        Patient seen as post-op critical care follow-up    HPI:  Chief Complaint: Chest Pain       Past Medical History: Triple-vessel CAD      Past Surgical History: None relevant       History of present illness goes back to the day of presentation when the patient experienced the sudden onset of substernal chest pain. The pain is exertional, retrosternal, and radiates down the left arm. At the time of the onset of the pain, the patient was exercising on a treadmill. He stopped exercising and waited for the pain to subside. After an hour, the pain persisted so he decided to come to the ED. He has a strong family history of premature heart disease/CAD as well. He is a non-smoker.       In the ED, the patient was hypertensive to 180/102. EKG revealed diffuse ST-depressions in the anterior leads concerning for a posterior wall MI. Cardiology was called and took the patient emergently to the cath lab where it was discovered that he had significant triple-vessel CAD with 90% stenosis of the ostium of the LAD with 99% stenosis in the proximal third of the vessel, likely the culprit lesion. He was placed on a heparin drip and nitro drip for chest pain. Admitted to CCU for monitoring and will be transferred to CTU after evaluation for CABG.      ROS: Denies headache, changes in vision, chest pain, palpitations, shortness of breath, cough, fever, chills, nausea, vomiting, diarrhea, and constipation. (10 Sep 2020 00:57)    See preop testing chart H&P    Interval event for past 24 hr:  BERTO HEIN  50y had no event.     Current Complains:  BERTO HEIN has no new complaints    REVIEW OF SYSTEMS:  CONSTITUTIONAL:  [-] weakness, [-] fevers, [-] chills  EYES/ENT: [-] visual changes, [-] vertigo, [-] throat pain   NECK: [-] pain, [-] stiffness  RESPIRATORY: [-] cough, [-] wheezing, [-] hemoptysis, [-] shortness of breath  CARDIOVASCULAR: [-] chest pain, [-] palpitations, [-] orthopnea  GASTROINTESTINAL:    [-]abdominal pain, [-] nausea, [-] vomiting, [-] hematemesis, [-] diarrhea, [-] constipation, [-] melena, [-] hematochezia.  GENITOURINARY: [-] dysuria, [-] frequency, [-] hematuria  NEUROLOGICAL: [-] numbness, [-] weakness  SKIN: [-] itching, [-] burning, [-] rashes, [-] lesions   All other review of systems is negative unless indicated above.    [  ] Unable to assess ROS because :    OBJECTIVE:  ICU Vital Signs Last 24 Hrs  T(C): 37.1 (14 Sep 2020 06:00), Max: 37.2 (13 Sep 2020 20:00)  T(F): 98.8 (14 Sep 2020 06:00), Max: 99 (13 Sep 2020 20:00)  HR: 81 (14 Sep 2020 07:00) (76 - 121)  BP: 97/64 (14 Sep 2020 07:00) (95/66 - 145/86)  BP(mean): 75 (14 Sep 2020 07:00) (72 - 108)  ABP: --  ABP(mean): --  RR: 19 (14 Sep 2020 07:00) (7 - 27)  SpO2: 99% (14 Sep 2020 07:00) (92% - 100%)      I&O's Summary    13 Sep 2020 07:01  -  14 Sep 2020 07:00  --------------------------------------------------------  IN: 720 mL / OUT: 2525 mL / NET: -1805 mL      PHYSICAL EXAM:  General: WN/WD NAD    HEENT:     [+] NCAT  [+] EOMI  [-] Conjuctival edema   [-] Icterus   [-] Thrush   [-] ETT  [-] NGT/OGT    Neck:         [+] FROM   [-] JVD     [-] Nodes     [-] Masses    [+] Mid-line trachea    [-] Tracheostomy    Chest:         [-] Sternal click   [-] Sternal drainage   [+] Pacing wires   [-] Chest tubes   [-] SubQ emphysema    Lungs:          [+] CTA   [-] Rhonchi   [-] Rales    [-] Wheezing    [-] Decreased BS    [-] Dullness R L    Cardiac:       [+] S1 [+] S2    [+] RRR   [-] Irregular   [-] S3   [-] S4    [-] Murmurs    [-] Rub    Abdomen:    [+] BS    [+] Soft    [+] Non-tender     [-] Distended    [-] Organomegaly  [-] PEG    Extremities:   [-] Cyanosis U/L   [-] Clubbing  U/L  [-] LE/UE Edema   [+] Capillary refill    [+] Pulses     Neuro:        [+] Awake   [+]  Alert   [-] Confused   [-] Lethargic   [-] Sedated   [-] Generalized Weakness    Skin:        [-] Rashes    [-] Erythema   [+] Normal incisions   [+] IV sites intact   [-] Sacral decubitus    Tubes:  LINES:    CAPILLARY BLOOD GLUCOSE      POCT Blood Glucose.: 151 mg/dL (14 Sep 2020 06:42)    CAPILLARY BLOOD GLUCOSE      POCT Blood Glucose.: 151 mg/dL (14 Sep 2020 06:42)  POCT Blood Glucose.: 133 mg/dL (13 Sep 2020 22:27)  POCT Blood Glucose.: 132 mg/dL (13 Sep 2020 16:32)  POCT Blood Glucose.: 134 mg/dL (13 Sep 2020 11:29)      HOSPITAL MEDICATIONS:  MEDICATIONS  (STANDING):  aspirin 325 milliGRAM(s) Oral daily  atorvastatin 80 milliGRAM(s) Oral at bedtime  chlorhexidine 4% Liquid 1 Application(s) Topical <User Schedule>  dextrose 5%. 1000 milliLiter(s) (50 mL/Hr) IV Continuous <Continuous>  dextrose 50% Injectable 50 milliLiter(s) IV Push every 15 minutes  famotidine    Tablet 20 milliGRAM(s) Oral daily  guaiFENesin  milliGRAM(s) Oral every 12 hours  heparin   Injectable 5000 Unit(s) SubCutaneous every 8 hours  insulin lispro (HumaLOG) corrective regimen sliding scale   SubCutaneous three times a day before meals  ipratropium 17 MICROgram(s) HFA Inhaler 2 Puff(s) Inhalation every 6 hours  isosorbide   dinitrate Tablet (ISORDIL) 5 milliGRAM(s) Oral three times a day  magnesium sulfate  IVPB 1 Gram(s) IV Intermittent every 12 hours  metoprolol tartrate 25 milliGRAM(s) Oral every 8 hours  nitroglycerin  Infusion 5 MICROgram(s)/Min (1.5 mL/Hr) IV Continuous <Continuous>  polyethylene glycol 3350 17 Gram(s) Oral daily  senna 2 Tablet(s) Oral at bedtime  simethicone 80 milliGRAM(s) Chew every 8 hours  sodium chloride 0.9%. 1000 milliLiter(s) (10 mL/Hr) IV Continuous <Continuous>    MEDICATIONS  (PRN):  dextrose 40% Gel 15 Gram(s) Oral once PRN Blood Glucose LESS THAN 70 milliGRAM(s)/deciliter  fentaNYL    Injectable 25 MICROGram(s) IV Push every 2 hours PRN breakthrough pain  glucagon  Injectable 1 milliGRAM(s) IntraMuscular once PRN Glucose LESS THAN 70 milligrams/deciliter  oxyCODONE    IR 10 milliGRAM(s) Oral every 4 hours PRN Severe Pain (7 - 10)  oxyCODONE    IR 5 milliGRAM(s) Oral every 4 hours PRN Moderate Pain (4 - 6)      LABS:                          9.4    7.38  )-----------( 123      ( 14 Sep 2020 01:30 )             29.8     09-14    136  |  99  |  25<H>  ----------------------------<  120<H>  4.1   |  31  |  1.0    Ca    8.3<L>      14 Sep 2020 01:30  Mg     2.0     09-14    TPro  5.6<L>  /  Alb  4.2  /  TBili  0.9  /  DBili  x   /  AST  43<H>  /  ALT  20  /  AlkPhos  57  09-13    PT/INR - ( 13 Sep 2020 01:30 )   PT: 16.20 sec;   INR: 1.41 ratio         PTT - ( 13 Sep 2020 01:30 )  PTT:27.2 sec        RADIOLOGY:  Reviewed and interpreted by me  CXR from 09-14-20 shows [+] mild congestion, [+] pneumothorax, [-] R/L effusion, [-] cardiomegaly,       ECG:  Reviewed and interpreted by me:   QTC:    Assessment:      PAST MEDICAL & SURGICAL HISTORY:  No pertinent past medical history    H/O shoulder surgery        PLAN:  Neuro: Pain control  Pulm: Encourage coughing, deep breathing and use of incentive spirometry. Wean off supplemental oxygen as able. Daily CXR.   Cardio: Monitor telemetry/alarms. Continue cardiac meds  GI: Tolerating diet. Continue stool softeners. Continue GI prophylaxis  Renal: monitor urine output, supplement electrolytes as needed  Vasc: Heparin SC/SCDs for DVT prophylaxis  Heme: Monitor H/H.   ID: Off antibiotics. Stable.  Endocrine: Monitor finger stick blood sugar and control hyperglycemia with insulin  Physical Therapy: OOB/ambulate        Discussed with Cardiothoracic Team at AM rounds.     CTU Attending Progress Daily Note     14 Sep 2020 07:42    Procedure:                 CABG                                 POD#           3        Patient seen as post-op critical care follow-up    HPI:  Chief Complaint: Chest Pain       Past Medical History: Triple-vessel CAD      Past Surgical History: None relevant       History of present illness goes back to the day of presentation when the patient experienced the sudden onset of substernal chest pain. The pain is exertional, retrosternal, and radiates down the left arm. At the time of the onset of the pain, the patient was exercising on a treadmill. He stopped exercising and waited for the pain to subside. After an hour, the pain persisted so he decided to come to the ED. He has a strong family history of premature heart disease/CAD as well. He is a non-smoker.       In the ED, the patient was hypertensive to 180/102. EKG revealed diffuse ST-depressions in the anterior leads concerning for a posterior wall MI. Cardiology was called and took the patient emergently to the cath lab where it was discovered that he had significant triple-vessel CAD with 90% stenosis of the ostium of the LAD with 99% stenosis in the proximal third of the vessel, likely the culprit lesion. He was placed on a heparin drip and nitro drip for chest pain. Admitted to CCU for monitoring and will be transferred to CTU after evaluation for CABG.      ROS: Denies headache, changes in vision, chest pain, palpitations, shortness of breath, cough, fever, chills, nausea, vomiting, diarrhea, and constipation. (10 Sep 2020 00:57)    See preop testing chart H&P    Interval event for past 24 hr:  BERTO HEIN  50y had no event.     Current Complains:  BERTO HEIN has no new complaints    REVIEW OF SYSTEMS:  CONSTITUTIONAL:  [-] weakness, [-] fevers, [-] chills  EYES/ENT: [-] visual changes, [-] vertigo, [-] throat pain   NECK: [-] pain, [-] stiffness  RESPIRATORY: [-] cough, [-] wheezing, [-] hemoptysis, [-] shortness of breath  CARDIOVASCULAR: [-] chest pain, [-] palpitations, [-] orthopnea  GASTROINTESTINAL:    [-]abdominal pain, [-] nausea, [-] vomiting, [-] hematemesis, [-] diarrhea, [-] constipation, [-] melena, [-] hematochezia.  GENITOURINARY: [-] dysuria, [-] frequency, [-] hematuria  NEUROLOGICAL: [-] numbness, [-] weakness  SKIN: [-] itching, [-] burning, [-] rashes, [-] lesions   All other review of systems is negative unless indicated above.    [  ] Unable to assess ROS because :    OBJECTIVE:  ICU Vital Signs Last 24 Hrs  T(C): 37.1 (14 Sep 2020 06:00), Max: 37.2 (13 Sep 2020 20:00)  T(F): 98.8 (14 Sep 2020 06:00), Max: 99 (13 Sep 2020 20:00)  HR: 81 (14 Sep 2020 07:00) (76 - 121)  BP: 97/64 (14 Sep 2020 07:00) (95/66 - 145/86)  BP(mean): 75 (14 Sep 2020 07:00) (72 - 108)  ABP: --  ABP(mean): --  RR: 19 (14 Sep 2020 07:00) (7 - 27)  SpO2: 99% (14 Sep 2020 07:00) (92% - 100%)      I&O's Summary    13 Sep 2020 07:01  -  14 Sep 2020 07:00  --------------------------------------------------------  IN: 720 mL / OUT: 2525 mL / NET: -1805 mL      PHYSICAL EXAM:  General: WN/WD NAD    HEENT:     [+] NCAT  [+] EOMI  [-] Conjuctival edema   [-] Icterus   [-] Thrush   [-] ETT  [-] NGT/OGT    Neck:         [+] FROM   [-] JVD     [-] Nodes     [-] Masses    [+] Mid-line trachea    [-] Tracheostomy    Chest:         [-] Sternal click   [-] Sternal drainage   [+] Pacing wires   [-] Chest tubes   [-] SubQ emphysema    Lungs:          [+] CTA   [-] Rhonchi   [-] Rales    [-] Wheezing    [-] Decreased BS    [-] Dullness R L    Cardiac:       [+] S1 [+] S2    [+] RRR   [-] Irregular   [-] S3   [-] S4    [-] Murmurs    [-] Rub    Abdomen:    [+] BS    [+] Soft    [+] Non-tender     [-] Distended    [-] Organomegaly  [-] PEG    Extremities:   [-] Cyanosis U/L   [-] Clubbing  U/L  [-] LE/UE Edema   [+] Capillary refill    [+] Pulses     Neuro:        [+] Awake   [+]  Alert   [-] Confused   [-] Lethargic   [-] Sedated   [-] Generalized Weakness    Skin:        [-] Rashes    [-] Erythema   [+] Normal incisions   [+] IV sites intact   [-] Sacral decubitus    Tubes:  LINES:    CAPILLARY BLOOD GLUCOSE      POCT Blood Glucose.: 151 mg/dL (14 Sep 2020 06:42)    CAPILLARY BLOOD GLUCOSE      POCT Blood Glucose.: 151 mg/dL (14 Sep 2020 06:42)  POCT Blood Glucose.: 133 mg/dL (13 Sep 2020 22:27)  POCT Blood Glucose.: 132 mg/dL (13 Sep 2020 16:32)  POCT Blood Glucose.: 134 mg/dL (13 Sep 2020 11:29)      HOSPITAL MEDICATIONS:  MEDICATIONS  (STANDING):  aspirin 325 milliGRAM(s) Oral daily  atorvastatin 80 milliGRAM(s) Oral at bedtime  chlorhexidine 4% Liquid 1 Application(s) Topical <User Schedule>  dextrose 5%. 1000 milliLiter(s) (50 mL/Hr) IV Continuous <Continuous>  dextrose 50% Injectable 50 milliLiter(s) IV Push every 15 minutes  famotidine    Tablet 20 milliGRAM(s) Oral daily  guaiFENesin  milliGRAM(s) Oral every 12 hours  heparin   Injectable 5000 Unit(s) SubCutaneous every 8 hours  insulin lispro (HumaLOG) corrective regimen sliding scale   SubCutaneous three times a day before meals  ipratropium 17 MICROgram(s) HFA Inhaler 2 Puff(s) Inhalation every 6 hours  isosorbide   dinitrate Tablet (ISORDIL) 5 milliGRAM(s) Oral three times a day  magnesium sulfate  IVPB 1 Gram(s) IV Intermittent every 12 hours  metoprolol tartrate 25 milliGRAM(s) Oral every 8 hours  nitroglycerin  Infusion 5 MICROgram(s)/Min (1.5 mL/Hr) IV Continuous <Continuous>  polyethylene glycol 3350 17 Gram(s) Oral daily  senna 2 Tablet(s) Oral at bedtime  simethicone 80 milliGRAM(s) Chew every 8 hours  sodium chloride 0.9%. 1000 milliLiter(s) (10 mL/Hr) IV Continuous <Continuous>    MEDICATIONS  (PRN):  dextrose 40% Gel 15 Gram(s) Oral once PRN Blood Glucose LESS THAN 70 milliGRAM(s)/deciliter  fentaNYL    Injectable 25 MICROGram(s) IV Push every 2 hours PRN breakthrough pain  glucagon  Injectable 1 milliGRAM(s) IntraMuscular once PRN Glucose LESS THAN 70 milligrams/deciliter  oxyCODONE    IR 10 milliGRAM(s) Oral every 4 hours PRN Severe Pain (7 - 10)  oxyCODONE    IR 5 milliGRAM(s) Oral every 4 hours PRN Moderate Pain (4 - 6)      LABS:                          9.4    7.38  )-----------( 123      ( 14 Sep 2020 01:30 )             29.8     09-14    136  |  99  |  25<H>  ----------------------------<  120<H>  4.1   |  31  |  1.0    Ca    8.3<L>      14 Sep 2020 01:30  Mg     2.0     09-14    TPro  5.6<L>  /  Alb  4.2  /  TBili  0.9  /  DBili  x   /  AST  43<H>  /  ALT  20  /  AlkPhos  57  09-13    PT/INR - ( 13 Sep 2020 01:30 )   PT: 16.20 sec;   INR: 1.41 ratio         PTT - ( 13 Sep 2020 01:30 )  PTT:27.2 sec        RADIOLOGY:  Reviewed and interpreted by me  CXR from 09-14-20 shows [+] mild congestion, [+] pneumothorax, [-] R/L effusion, [-] cardiomegaly,       ECG:  Reviewed and interpreted by me:   QTC:    Assessment:  CAD SP CABG  pneumothorax    PAST MEDICAL & SURGICAL HISTORY:  No pertinent past medical history    H/O shoulder surgery        PLAN:  Neuro: Pain control  Pulm: Encourage coughing, deep breathing and use of incentive spirometry. Wean off supplemental oxygen as able. Daily CXR.   Cardio: Monitor telemetry/alarms. Continue cardiac meds  GI: Tolerating diet. Continue stool softeners. Continue GI prophylaxis  Renal: monitor urine output, supplement electrolytes as needed  Vasc: Heparin SC/SCDs for DVT prophylaxis  Heme: Monitor H/H.   ID: Off antibiotics. Stable.  Endocrine: Monitor finger stick blood sugar and control hyperglycemia with insulin  Physical Therapy: OOB/ambulate        Discussed with Cardiothoracic Team at AM rounds.

## 2020-09-14 NOTE — PROGRESS NOTE ADULT - SUBJECTIVE AND OBJECTIVE BOX
OPERATIVE PROCEDURE(s):                POD #                       50yMale  SURGEON(s): VEENA Ortega  SUBJECTIVE ASSESSMENT:   Vital Signs Last 24 Hrs  T(F): 98.8 (14 Sep 2020 06:00), Max: 99 (13 Sep 2020 20:00)  HR: 81 (14 Sep 2020 07:00) (76 - 121)  BP: 97/64 (14 Sep 2020 07:00) (95/66 - 145/86)  BP(mean): 75 (14 Sep 2020 07:00) (72 - 108)  ABP: --  ABP(mean): --  RR: 19 (14 Sep 2020 07:00) (7 - 27)  SpO2: 99% (14 Sep 2020 07:00) (92% - 100%)  CVP(mm Hg): --  CVP(cm H2O): --  CO: --  CI: --  PA: --  SVR: --    I&O's Detail    13 Sep 2020 07:01  -  14 Sep 2020 07:00  --------------------------------------------------------  IN:    IV PiggyBack: 200 mL    Oral Fluid: 520 mL  Total IN: 720 mL    OUT:    Voided (mL): 2525 mL  Total OUT: 2525 mL        Net:   I&O's Detail    12 Sep 2020 07:01  -  13 Sep 2020 07:00  --------------------------------------------------------  Total NET: 541 mL      13 Sep 2020 07:01  -  14 Sep 2020 07:00  --------------------------------------------------------  Total NET: -1805 mL        CAPILLARY BLOOD GLUCOSE      POCT Blood Glucose.: 151 mg/dL (14 Sep 2020 06:42)  POCT Blood Glucose.: 133 mg/dL (13 Sep 2020 22:27)  POCT Blood Glucose.: 132 mg/dL (13 Sep 2020 16:32)  POCT Blood Glucose.: 134 mg/dL (13 Sep 2020 11:29)    Physical Exam:  General: NAD; A&Ox3/Patient is intubated and sedated  Cardiac: S1/S2, RRR, no murmur, no rubs  Lungs: unlabored respirations, CTA b/l, no wheeze, no rales, no crackles  Abdomen: Soft/NT/ND; positive bowel sounds x 4  Sternum: Intact, no click, incision healing well with no drainage  Incisions: Incisions clean/dry/intact  Extremities: No edema b/l lower extremities; good capillary refill; no cyanosis; palpable 1+ pedal pulses b/l    Central Venous Catheter: Yes[]  No[] , If Yes indication:           Day #  Adams Catheter: Yes  [] , No  [] , If yes indication:                      Day #  NGT: Yes [] No [] ,    If Yes Placement:                                     Day #  EPICARDIAL WIRES:  [] YES [] NO                                              Day #  BOWEL MOVEMENT:  [] YES [] NO, If No, Timing since last BM:      Day #  CHEST TUBE(Left/Right):  [] YES [] NO, If yes -  AIR LEAKS:  [] YES [] NO        LABS:                        9.4<L>  7.38  )-----------( 123<L>    ( 14 Sep 2020 01:30 )             29.8<L>                        9.8<L>  8.27  )-----------( 112<L>    ( 13 Sep 2020 01:30 )             30.8<L>    09-14    136  |  99  |  25<H>  ----------------------------<  120<H>  4.1   |  31  |  1.0  09-13    133<L>  |  101  |  19  ----------------------------<  115<H>  4.8   |  26  |  0.8    Ca    8.3<L>      14 Sep 2020 01:30  Mg     2.0     09-14    TPro  5.6<L> [6.0 - 8.0]  /  Alb  4.2 [3.5 - 5.2]  /  TBili  0.9 [0.2 - 1.2]  /  DBili  x   /  AST  43<H> [0 - 41]  /  ALT  20 [0 - 41]  /  AlkPhos  57 [30 - 115]  09-13    PT/INR - ( 13 Sep 2020 01:30 )   PT: ;   INR: 1.41 ratio         PTT - ( 13 Sep 2020 01:30 )  PTT:27.2 sec      RADIOLOGY & ADDITIONAL TESTS:  CXR:  EKG:  MEDICATIONS  (STANDING):  aspirin 325 milliGRAM(s) Oral daily  atorvastatin 80 milliGRAM(s) Oral at bedtime  chlorhexidine 4% Liquid 1 Application(s) Topical <User Schedule>  dextrose 5%. 1000 milliLiter(s) (50 mL/Hr) IV Continuous <Continuous>  dextrose 50% Injectable 50 milliLiter(s) IV Push every 15 minutes  famotidine    Tablet 20 milliGRAM(s) Oral daily  guaiFENesin  milliGRAM(s) Oral every 12 hours  heparin   Injectable 5000 Unit(s) SubCutaneous every 8 hours  insulin lispro (HumaLOG) corrective regimen sliding scale   SubCutaneous three times a day before meals  ipratropium 17 MICROgram(s) HFA Inhaler 2 Puff(s) Inhalation every 6 hours  isosorbide   dinitrate Tablet (ISORDIL) 5 milliGRAM(s) Oral three times a day  magnesium sulfate  IVPB 1 Gram(s) IV Intermittent every 12 hours  metoprolol tartrate 25 milliGRAM(s) Oral every 8 hours  nitroglycerin  Infusion 5 MICROgram(s)/Min (1.5 mL/Hr) IV Continuous <Continuous>  polyethylene glycol 3350 17 Gram(s) Oral daily  senna 2 Tablet(s) Oral at bedtime  simethicone 80 milliGRAM(s) Chew every 8 hours  sodium chloride 0.9%. 1000 milliLiter(s) (10 mL/Hr) IV Continuous <Continuous>    MEDICATIONS  (PRN):  dextrose 40% Gel 15 Gram(s) Oral once PRN Blood Glucose LESS THAN 70 milliGRAM(s)/deciliter  fentaNYL    Injectable 25 MICROGram(s) IV Push every 2 hours PRN breakthrough pain  glucagon  Injectable 1 milliGRAM(s) IntraMuscular once PRN Glucose LESS THAN 70 milligrams/deciliter  oxyCODONE    IR 10 milliGRAM(s) Oral every 4 hours PRN Severe Pain (7 - 10)  oxyCODONE    IR 5 milliGRAM(s) Oral every 4 hours PRN Moderate Pain (4 - 6)    HEPARIN:  [] YES [] NO  Dose: XX UNITS/HR UNITS Q8H  LOVENOX:[] YES [] NO  Dose: XX mg Q24H  COUMADIN: []  YES [] NO  Dose: XX mg  Q24H  SCD's: YES b/l  GI Prophylaxis: Protonix [], Pepcid [], None [], (Contra-indication:.....)    Post-Op Beta-Blockers: Yes [], No[], If No, then contraindication:  Post-Op Aspirin: Yes [],  No [], If No, then contraindication:  Post-Op Statin: Yes [], No[], If No, then contraindication:  Allergies    No Known Allergies    Intolerances      Ambulation/Activity Status:    Assessment/Plan:  50y Male status-post .....  - Case and plan discussed with CTU Intensivist and CT Surgeon - Dr. Salgado/Shannon/Namrata   - Continue CTU supportive care    - Continue DVT/GI prophylaxis  - Incentive Spirometry 10 times an hour  - Continue to advance physical activity as tolerated and continue PT/OT as directed  1. CAD: Continue ASA, statin, BB  2. HTN:   3. A. Fib:   4. COPD/Hypoxia:   5. DM/Glucose Control:     Social Service Disposition:     OPERATIVE PROCEDURE(s):  CABGx4 + left radial harvest              POD #     3                  50yMale  SURGEON(s): VEENA Ortega  SUBJECTIVE ASSESSMENT: pt seen and examined. no acute complaints   Vital Signs Last 24 Hrs  T(F): 98.8 (14 Sep 2020 06:00), Max: 99 (13 Sep 2020 20:00)  HR: 81 (14 Sep 2020 07:00) (76 - 121)  BP: 97/64 (14 Sep 2020 07:00) (95/66 - 145/86)  BP(mean): 75 (14 Sep 2020 07:00) (72 - 108)  RR: 19 (14 Sep 2020 07:00) (7 - 27)  SpO2: 99% (14 Sep 2020 07:00) (92% - 100%)      I&O's Detail    13 Sep 2020 07:01  -  14 Sep 2020 07:00  --------------------------------------------------------  IN:    IV PiggyBack: 200 mL    Oral Fluid: 520 mL  Total IN: 720 mL    OUT:    Voided (mL): 2525 mL  Total OUT: 2525 mL        Net:   I&O's Detail    12 Sep 2020 07:01  -  13 Sep 2020 07:00  --------------------------------------------------------  Total NET: 541 mL      13 Sep 2020 07:01  -  14 Sep 2020 07:00  --------------------------------------------------------  Total NET: -1805 mL        CAPILLARY BLOOD GLUCOSE      POCT Blood Glucose.: 151 mg/dL (14 Sep 2020 06:42)  POCT Blood Glucose.: 133 mg/dL (13 Sep 2020 22:27)  POCT Blood Glucose.: 132 mg/dL (13 Sep 2020 16:32)  POCT Blood Glucose.: 134 mg/dL (13 Sep 2020 11:29)    Physical Exam:  General: NAD; A&Ox3  Cardiac: S1/S2, RRR, no murmur, no rubs  Lungs: decreased bs at bases bilaterally  Abdomen: Soft/NT/ND; positive bowel sounds x 4  Sternum: Intact, no click, incision healing well with no drainage  Incisions: Incisions clean/dry/intact  Extremities: No edema b/l lower extremities; good capillary refill; no cyanosis; palpable 1+ pedal pulses b/l    Central Venous Catheter: Yes[x]  No[] , If Yes indication:   hd unstable        Day #3 - d/c today  Adams Catheter: Yes  [] , No  [x] , If yes indication:                    Day #  NGT: Yes [] No [x] ,    If Yes Placement:                                     Day #  EPICARDIAL WIRES:  [x] YES [] NO                                              Day #2  BOWEL MOVEMENT:  [] YES [x] NO, If No, Timing since last BM:      Day #  CHEST TUBE(Left/Right):  [] YES [x] NO, If yes -  AIR LEAKS:  [] YES [] NO        LABS:                        9.4<L>  7.38  )-----------( 123<L>    ( 14 Sep 2020 01:30 )             29.8<L>                        9.8<L>  8.27  )-----------( 112<L>    ( 13 Sep 2020 01:30 )             30.8<L>    09-14    136  |  99  |  25<H>  ----------------------------<  120<H>  4.1   |  31  |  1.0  09-13    133<L>  |  101  |  19  ----------------------------<  115<H>  4.8   |  26  |  0.8    Ca    8.3<L>      14 Sep 2020 01:30  Mg     2.0     09-14    TPro  5.6<L> [6.0 - 8.0]  /  Alb  4.2 [3.5 - 5.2]  /  TBili  0.9 [0.2 - 1.2]  /  DBili  x   /  AST  43<H> [0 - 41]  /  ALT  20 [0 - 41]  /  AlkPhos  57 [30 - 115]  09-13    PT/INR - ( 13 Sep 2020 01:30 )   PT: ;   INR: 1.41 ratio         PTT - ( 13 Sep 2020 01:30 )  PTT:27.2 sec      RADIOLOGY & ADDITIONAL TESTS:  CXR: xra< from: Xray Chest 1 View- PORTABLE-Routine (09.14.20 @ 05:08) >  Impression:  Stable right pneumothorax.  Stable bilateral pleural effusion/opacities.    < end of copied text >    EKG: < from: 12 Lead ECG (09.14.20 @ 07:40) >  Ventricular Rate 88 BPM    Atrial Rate 88 BPM    P-R Interval 136 ms    QRS Duration 88 ms    Q-T Interval 370 ms    QTC Calculation(Bazett) 447 ms    P Axis 1 degrees    R Axis 70 degrees    T Axis -21 degrees    Diagnosis Line Normal sinus rhythm  T wave abnormality, consider inferior ischemia  Abnormal ECG    < end of copied text >    MEDICATIONS  (STANDING):  aspirin 325 milliGRAM(s) Oral daily  atorvastatin 80 milliGRAM(s) Oral at bedtime  chlorhexidine 4% Liquid 1 Application(s) Topical <User Schedule>  dextrose 5%. 1000 milliLiter(s) (50 mL/Hr) IV Continuous <Continuous>  dextrose 50% Injectable 50 milliLiter(s) IV Push every 15 minutes  famotidine    Tablet 20 milliGRAM(s) Oral daily  guaiFENesin  milliGRAM(s) Oral every 12 hours  heparin   Injectable 5000 Unit(s) SubCutaneous every 8 hours  insulin lispro (HumaLOG) corrective regimen sliding scale   SubCutaneous three times a day before meals  ipratropium 17 MICROgram(s) HFA Inhaler 2 Puff(s) Inhalation every 6 hours  isosorbide   dinitrate Tablet (ISORDIL) 5 milliGRAM(s) Oral three times a day  magnesium sulfate  IVPB 1 Gram(s) IV Intermittent every 12 hours  metoprolol tartrate 25 milliGRAM(s) Oral every 8 hours  nitroglycerin  Infusion 5 MICROgram(s)/Min (1.5 mL/Hr) IV Continuous <Continuous>  polyethylene glycol 3350 17 Gram(s) Oral daily  senna 2 Tablet(s) Oral at bedtime  simethicone 80 milliGRAM(s) Chew every 8 hours  sodium chloride 0.9%. 1000 milliLiter(s) (10 mL/Hr) IV Continuous <Continuous>    MEDICATIONS  (PRN):  dextrose 40% Gel 15 Gram(s) Oral once PRN Blood Glucose LESS THAN 70 milliGRAM(s)/deciliter  fentaNYL    Injectable 25 MICROGram(s) IV Push every 2 hours PRN breakthrough pain  glucagon  Injectable 1 milliGRAM(s) IntraMuscular once PRN Glucose LESS THAN 70 milligrams/deciliter  oxyCODONE    IR 10 milliGRAM(s) Oral every 4 hours PRN Severe Pain (7 - 10)  oxyCODONE    IR 5 milliGRAM(s) Oral every 4 hours PRN Moderate Pain (4 - 6)    HEPARIN:  [x] YES [] NO  Dose: 5000 UNITS Q8H  LOVENOX:[] YES [x] NO  Dose: XX mg Q24H  COUMADIN: []  YES [x] NO  Dose: XX mg  Q24H  SCD's: YES b/l  GI Prophylaxis: Protonix [], Pepcid [x], None [], (Contra-indication:.....)    Post-Op Beta-Blockers: Yes [x], No[], If No, then contraindication:  Post-Op Aspirin: Yes [x],  No [], If No, then contraindication:  Post-Op Statin: Yes [x], No[], If No, then contraindication:  Allergies    No Known Allergies    Intolerances      Ambulation/Activity Status: ambulate     Assessment/Plan:  50y Male status-post CABGx4/ left radial harvest POD#3  - Case and plan discussed with CTU Intensivist and CT Surgeon - Dr. Salgado  - Continue CTU supportive care    - Continue DVT/GI prophylaxis  - Incentive Spirometry 10 times an hour  - Continue to advance physical activity as tolerated and continue PT/OT as directed  1. CAD: Continue ASA, statin, BB, pain control as needed, switch to imdur   2. HTN: decrease metoprolol to 12.5 q 8 hours, imdur  3. A. Fib prophylaxis: lopressor, mag 1gm bid  4. COPD/Hypoxia:  cont nebs, mucinex, encourage incentive spirometry, wean o2 as tolerated, lasix fluid overload  5. DM/Glucose Control: cont sliding scale  6. right ptx stable, pt o2 sat 99% on RA    Social Service Disposition:  home

## 2020-09-15 ENCOUNTER — TRANSCRIPTION ENCOUNTER (OUTPATIENT)
Age: 50
End: 2020-09-15

## 2020-09-15 VITALS
HEART RATE: 87 BPM | DIASTOLIC BLOOD PRESSURE: 54 MMHG | TEMPERATURE: 99 F | OXYGEN SATURATION: 97 % | RESPIRATION RATE: 28 BRPM | SYSTOLIC BLOOD PRESSURE: 98 MMHG

## 2020-09-15 LAB
ANION GAP SERPL CALC-SCNC: 8 MMOL/L — SIGNIFICANT CHANGE UP (ref 7–14)
BUN SERPL-MCNC: 25 MG/DL — HIGH (ref 10–20)
CALCIUM SERPL-MCNC: 8.6 MG/DL — SIGNIFICANT CHANGE UP (ref 8.5–10.1)
CHLORIDE SERPL-SCNC: 101 MMOL/L — SIGNIFICANT CHANGE UP (ref 98–110)
CO2 SERPL-SCNC: 29 MMOL/L — SIGNIFICANT CHANGE UP (ref 17–32)
CREAT SERPL-MCNC: 0.8 MG/DL — SIGNIFICANT CHANGE UP (ref 0.7–1.5)
GLUCOSE BLDC GLUCOMTR-MCNC: 118 MG/DL — HIGH (ref 70–99)
GLUCOSE BLDC GLUCOMTR-MCNC: 157 MG/DL — HIGH (ref 70–99)
GLUCOSE SERPL-MCNC: 140 MG/DL — HIGH (ref 70–99)
HCT VFR BLD CALC: 27.4 % — LOW (ref 42–52)
HGB BLD-MCNC: 9 G/DL — LOW (ref 14–18)
MAGNESIUM SERPL-MCNC: 1.9 MG/DL — SIGNIFICANT CHANGE UP (ref 1.8–2.4)
MCHC RBC-ENTMCNC: 28.2 PG — SIGNIFICANT CHANGE UP (ref 27–31)
MCHC RBC-ENTMCNC: 32.8 G/DL — SIGNIFICANT CHANGE UP (ref 32–37)
MCV RBC AUTO: 85.9 FL — SIGNIFICANT CHANGE UP (ref 80–94)
NRBC # BLD: 0 /100 WBCS — SIGNIFICANT CHANGE UP (ref 0–0)
PLATELET # BLD AUTO: 139 K/UL — SIGNIFICANT CHANGE UP (ref 130–400)
POTASSIUM SERPL-MCNC: 3.7 MMOL/L — SIGNIFICANT CHANGE UP (ref 3.5–5)
POTASSIUM SERPL-SCNC: 3.7 MMOL/L — SIGNIFICANT CHANGE UP (ref 3.5–5)
RBC # BLD: 3.19 M/UL — LOW (ref 4.7–6.1)
RBC # FLD: 12.4 % — SIGNIFICANT CHANGE UP (ref 11.5–14.5)
SODIUM SERPL-SCNC: 138 MMOL/L — SIGNIFICANT CHANGE UP (ref 135–146)
TSH RECEP AB FLD-ACNC: <1.1 IU/L — SIGNIFICANT CHANGE UP (ref 0–1.75)
WBC # BLD: 5.15 K/UL — SIGNIFICANT CHANGE UP (ref 4.8–10.8)
WBC # FLD AUTO: 5.15 K/UL — SIGNIFICANT CHANGE UP (ref 4.8–10.8)

## 2020-09-15 PROCEDURE — 71046 X-RAY EXAM CHEST 2 VIEWS: CPT | Mod: 26

## 2020-09-15 PROCEDURE — 99232 SBSQ HOSP IP/OBS MODERATE 35: CPT

## 2020-09-15 PROCEDURE — 93010 ELECTROCARDIOGRAM REPORT: CPT

## 2020-09-15 RX ORDER — POLYETHYLENE GLYCOL 3350 17 G/17G
17 POWDER, FOR SOLUTION ORAL
Qty: 510 | Refills: 0
Start: 2020-09-15 | End: 2020-10-14

## 2020-09-15 RX ORDER — FUROSEMIDE 40 MG
40 TABLET ORAL ONCE
Refills: 0 | Status: COMPLETED | OUTPATIENT
Start: 2020-09-15 | End: 2020-09-15

## 2020-09-15 RX ORDER — ASPIRIN/CALCIUM CARB/MAGNESIUM 324 MG
1 TABLET ORAL
Qty: 30 | Refills: 0
Start: 2020-09-15 | End: 2020-10-14

## 2020-09-15 RX ORDER — FAMOTIDINE 10 MG/ML
1 INJECTION INTRAVENOUS
Qty: 30 | Refills: 0
Start: 2020-09-15 | End: 2020-10-14

## 2020-09-15 RX ORDER — METOPROLOL TARTRATE 50 MG
0.5 TABLET ORAL
Qty: 30 | Refills: 0
Start: 2020-09-15 | End: 2020-10-14

## 2020-09-15 RX ORDER — ATORVASTATIN CALCIUM 80 MG/1
1 TABLET, FILM COATED ORAL
Qty: 30 | Refills: 0
Start: 2020-09-15 | End: 2020-10-14

## 2020-09-15 RX ORDER — ISOSORBIDE MONONITRATE 60 MG/1
0.5 TABLET, EXTENDED RELEASE ORAL
Qty: 15 | Refills: 0
Start: 2020-09-15 | End: 2020-10-14

## 2020-09-15 RX ORDER — POTASSIUM CHLORIDE 20 MEQ
40 PACKET (EA) ORAL ONCE
Refills: 0 | Status: COMPLETED | OUTPATIENT
Start: 2020-09-15 | End: 2020-09-15

## 2020-09-15 RX ADMIN — Medication 40 MILLIEQUIVALENT(S): at 05:13

## 2020-09-15 RX ADMIN — POLYETHYLENE GLYCOL 3350 17 GRAM(S): 17 POWDER, FOR SOLUTION ORAL at 12:28

## 2020-09-15 RX ADMIN — CHLORHEXIDINE GLUCONATE 1 APPLICATION(S): 213 SOLUTION TOPICAL at 05:15

## 2020-09-15 RX ADMIN — Medication 325 MILLIGRAM(S): at 12:28

## 2020-09-15 RX ADMIN — Medication 12.5 MILLIGRAM(S): at 13:13

## 2020-09-15 RX ADMIN — Medication 100 GRAM(S): at 05:14

## 2020-09-15 RX ADMIN — Medication 1: at 12:32

## 2020-09-15 RX ADMIN — Medication 600 MILLIGRAM(S): at 05:14

## 2020-09-15 RX ADMIN — SIMETHICONE 80 MILLIGRAM(S): 80 TABLET, CHEWABLE ORAL at 13:14

## 2020-09-15 RX ADMIN — FAMOTIDINE 20 MILLIGRAM(S): 10 INJECTION INTRAVENOUS at 12:32

## 2020-09-15 RX ADMIN — OXYCODONE HYDROCHLORIDE 5 MILLIGRAM(S): 5 TABLET ORAL at 08:00

## 2020-09-15 RX ADMIN — ISOSORBIDE MONONITRATE 15 MILLIGRAM(S): 60 TABLET, EXTENDED RELEASE ORAL at 12:28

## 2020-09-15 RX ADMIN — Medication 12.5 MILLIGRAM(S): at 05:14

## 2020-09-15 RX ADMIN — Medication 40 MILLIGRAM(S): at 09:31

## 2020-09-15 RX ADMIN — OXYCODONE HYDROCHLORIDE 5 MILLIGRAM(S): 5 TABLET ORAL at 07:39

## 2020-09-15 RX ADMIN — SIMETHICONE 80 MILLIGRAM(S): 80 TABLET, CHEWABLE ORAL at 05:14

## 2020-09-15 RX ADMIN — Medication 40 MILLIEQUIVALENT(S): at 09:32

## 2020-09-15 NOTE — DISCHARGE NOTE PROVIDER - CARE PROVIDERS DIRECT ADDRESSES
,[email or web address]

## 2020-09-15 NOTE — PROGRESS NOTE ADULT - SUBJECTIVE AND OBJECTIVE BOX
CTU Attending Progress Daily Note     15 Sep 2020 06:38    Procedure:                                                  POD#                   Patient seen as post-op critical care follow-up    HPI:  Chief Complaint: Chest Pain       Past Medical History: Triple-vessel CAD      Past Surgical History: None relevant       History of present illness goes back to the day of presentation when the patient experienced the sudden onset of substernal chest pain. The pain is exertional, retrosternal, and radiates down the left arm. At the time of the onset of the pain, the patient was exercising on a treadmill. He stopped exercising and waited for the pain to subside. After an hour, the pain persisted so he decided to come to the ED. He has a strong family history of premature heart disease/CAD as well. He is a non-smoker.       In the ED, the patient was hypertensive to 180/102. EKG revealed diffuse ST-depressions in the anterior leads concerning for a posterior wall MI. Cardiology was called and took the patient emergently to the cath lab where it was discovered that he had significant triple-vessel CAD with 90% stenosis of the ostium of the LAD with 99% stenosis in the proximal third of the vessel, likely the culprit lesion. He was placed on a heparin drip and nitro drip for chest pain. Admitted to CCU for monitoring and will be transferred to CTU after evaluation for CABG.      ROS: Denies headache, changes in vision, chest pain, palpitations, shortness of breath, cough, fever, chills, nausea, vomiting, diarrhea, and constipation. (10 Sep 2020 00:57)    See preop testing chart H&P    Interval event for past 24 hr:  BERTO HEIN  50y had no event.     Current Complains:  BERTO HEIN has no new complaints    REVIEW OF SYSTEMS:  CONSTITUTIONAL:  [-] weakness, [-] fevers, [-] chills  EYES/ENT: [-] visual changes, [-] vertigo, [-] throat pain   NECK: [-] pain, [-] stiffness  RESPIRATORY: [-] cough, [-] wheezing, [-] hemoptysis, [-] shortness of breath  CARDIOVASCULAR: [-] chest pain, [-] palpitations, [-] orthopnea  GASTROINTESTINAL:    [-]abdominal pain, [-] nausea, [-] vomiting, [-] hematemesis, [-] diarrhea, [-] constipation, [-] melena, [-] hematochezia.  GENITOURINARY: [-] dysuria, [-] frequency, [-] hematuria  NEUROLOGICAL: [-] numbness, [-] weakness  SKIN: [-] itching, [-] burning, [-] rashes, [-] lesions   All other review of systems is negative unless indicated above.    [  ] Unable to assess ROS because :    OBJECTIVE:  ICU Vital Signs Last 24 Hrs  T(C): 36.7 (15 Sep 2020 00:00), Max: 37.1 (14 Sep 2020 12:00)  T(F): 98 (15 Sep 2020 00:00), Max: 98.7 (14 Sep 2020 12:00)  HR: 98 (15 Sep 2020 06:00) (81 - 113)  BP: 112/65 (15 Sep 2020 06:00) (95/56 - 124/71)  BP(mean): 83 (15 Sep 2020 06:00) (67 - 90)  ABP: --  ABP(mean): --  RR: 18 (15 Sep 2020 06:00) (17 - 23)  SpO2: 93% (15 Sep 2020 06:00) (91% - 99%)      I&O's Summary    13 Sep 2020 07:01  -  14 Sep 2020 07:00  --------------------------------------------------------  IN: 720 mL / OUT: 2525 mL / NET: -1805 mL    14 Sep 2020 07:01  -  15 Sep 2020 06:38  --------------------------------------------------------  IN: 900 mL / OUT: 600 mL / NET: 300 mL      Adult Advanced Hemodynamics Last 24 Hrs  CVP(mm Hg): --  CVP(cm H2O): --  CO: --  CI: --  PA: --  PA(mean): --  PCWP: --  SVR: --  SVRI: --  PVR: --  PVRI: --      PHYSICAL EXAM:  General: WN/WD NAD    HEENT:     [+] NCAT  [+] EOMI  [-] Conjuctival edema   [-] Icterus   [-] Thrush   [-] ETT  [-] NGT/OGT    Neck:         [+] FROM   [-] JVD     [-] Nodes     [-] Masses    [+] Mid-line trachea    [-] Tracheostomy    Chest:         [-] Sternal click   [-] Sternal drainage   [+] Pacing wires   [+] Chest tubes   [-] SubQ emphysema    Lungs:          [+] CTA   [-] Rhonchi   [-] Rales    [-] Wheezing    [-] Decreased BS    [-] Dullness R L    Cardiac:       [+] S1 [+] S2    [+] RRR   [-] Irregular   [-] S3   [-] S4    [-] Murmurs    [-] Rub    Abdomen:    [+] BS    [+] Soft    [+] Non-tender     [-] Distended    [-] Organomegaly  [-] PEG    Extremities:   [-] Cyanosis U/L   [-] Clubbing  U/L  [-] LE/UE Edema   [+] Capillary refill    [+] Pulses     Neuro:        [+] Awake   [+]  Alert   [-] Confused   [-] Lethargic   [-] Sedated   [-] Generalized Weakness    Skin:        [-] Rashes    [-] Erythema   [+] Normal incisions   [+] IV sites intact   [-] Sacral decubitus    Tubes:  LINES:    CAPILLARY BLOOD GLUCOSE      POCT Blood Glucose.: 115 mg/dL (14 Sep 2020 22:27)    CAPILLARY BLOOD GLUCOSE      POCT Blood Glucose.: 115 mg/dL (14 Sep 2020 22:27)  POCT Blood Glucose.: 132 mg/dL (14 Sep 2020 15:49)  POCT Blood Glucose.: 138 mg/dL (14 Sep 2020 11:21)  POCT Blood Glucose.: 151 mg/dL (14 Sep 2020 06:42)      HOSPITAL MEDICATIONS:  MEDICATIONS  (STANDING):  aspirin 325 milliGRAM(s) Oral daily  atorvastatin 80 milliGRAM(s) Oral at bedtime  chlorhexidine 4% Liquid 1 Application(s) Topical <User Schedule>  dextrose 5%. 1000 milliLiter(s) (50 mL/Hr) IV Continuous <Continuous>  dextrose 50% Injectable 50 milliLiter(s) IV Push every 15 minutes  famotidine    Tablet 20 milliGRAM(s) Oral daily  guaiFENesin  milliGRAM(s) Oral every 12 hours  heparin   Injectable 5000 Unit(s) SubCutaneous every 8 hours  insulin lispro (HumaLOG) corrective regimen sliding scale   SubCutaneous three times a day before meals  ipratropium 17 MICROgram(s) HFA Inhaler 2 Puff(s) Inhalation every 6 hours  isosorbide   mononitrate ER Tablet (IMDUR) 15 milliGRAM(s) Oral daily  magnesium sulfate  IVPB 1 Gram(s) IV Intermittent every 12 hours  metoprolol tartrate 12.5 milliGRAM(s) Oral every 8 hours  polyethylene glycol 3350 17 Gram(s) Oral daily  senna 2 Tablet(s) Oral at bedtime  simethicone 80 milliGRAM(s) Chew every 8 hours  sodium chloride 0.9%. 1000 milliLiter(s) (10 mL/Hr) IV Continuous <Continuous>    MEDICATIONS  (PRN):  dextrose 40% Gel 15 Gram(s) Oral once PRN Blood Glucose LESS THAN 70 milliGRAM(s)/deciliter  fentaNYL    Injectable 25 MICROGram(s) IV Push every 2 hours PRN breakthrough pain  glucagon  Injectable 1 milliGRAM(s) IntraMuscular once PRN Glucose LESS THAN 70 milligrams/deciliter  oxyCODONE    IR 10 milliGRAM(s) Oral every 4 hours PRN Severe Pain (7 - 10)  oxyCODONE    IR 5 milliGRAM(s) Oral every 4 hours PRN Moderate Pain (4 - 6)      LABS:                          9.0    5.15  )-----------( 139      ( 15 Sep 2020 02:00 )             27.4     09-15    138  |  101  |  25<H>  ----------------------------<  140<H>  3.7   |  29  |  0.8    Ca    8.6      15 Sep 2020 02:00  Mg     1.9     09-15              RADIOLOGY:  Reviewed and interpreted by me  CXR from 09-15-20 shows [+] mild congestion, [-] pneumothorax, [-] R/L effusion, [-] cardiomegaly,   NGT in place, S-G Catheter in place, R/L TLC in place, R/L Chest Tubes in place    ECG:  Reviewed and interpreted by me:   QTC:    Assessment:      PAST MEDICAL & SURGICAL HISTORY:  No pertinent past medical history    H/O shoulder surgery        PLAN:  Neuro: Pain control  Pulm: Encourage coughing, deep breathing and use of incentive spirometry. Wean off supplemental oxygen as able. Daily CXR.   Cardio: Monitor telemetry/alarms. Continue cardiac meds  GI: Tolerating diet. Continue stool softeners. Continue GI prophylaxis  Renal: monitor urine output, supplement electrolytes as needed  Vasc: Heparin SC/SCDs for DVT prophylaxis  Heme: Monitor H/H.   ID: Off antibiotics. Stable.  Endocrine: Monitor finger stick blood sugar and control hyperglycemia with insulin  Physical Therapy: OOB/ambulate  Tubes: Monitor Chest tube output      Discussed with Cardiothoracic Team at AM rounds.    45 minutes of critical care time spent providing medical care for patient's acute illness/conditions that impairs at least one vital organ system and/or poses a high risk of imminent or life threatening deterioration in the patient's condition. It includes time spent evaluating and treating the patient's acute illness as well as time spent reviewing labs, radiology, discussing goals of care with patient and/or patient's family, and discussing the case with a multidisciplinary team in an effort to prevent further life threatening deterioration or end organ damage. This time is independent of any procedures performed. CTU Attending Progress Daily Note     15 Sep 2020 06:38    Procedure:                    CABG                              POD#                   Patient seen as post-op critical care follow-up    HPI:  Chief Complaint: Chest Pain       Past Medical History: Triple-vessel CAD      Past Surgical History: None relevant       History of present illness goes back to the day of presentation when the patient experienced the sudden onset of substernal chest pain. The pain is exertional, retrosternal, and radiates down the left arm. At the time of the onset of the pain, the patient was exercising on a treadmill. He stopped exercising and waited for the pain to subside. After an hour, the pain persisted so he decided to come to the ED. He has a strong family history of premature heart disease/CAD as well. He is a non-smoker.       In the ED, the patient was hypertensive to 180/102. EKG revealed diffuse ST-depressions in the anterior leads concerning for a posterior wall MI. Cardiology was called and took the patient emergently to the cath lab where it was discovered that he had significant triple-vessel CAD with 90% stenosis of the ostium of the LAD with 99% stenosis in the proximal third of the vessel, likely the culprit lesion. He was placed on a heparin drip and nitro drip for chest pain. Admitted to CCU for monitoring and will be transferred to CTU after evaluation for CABG.      ROS: Denies headache, changes in vision, chest pain, palpitations, shortness of breath, cough, fever, chills, nausea, vomiting, diarrhea, and constipation. (10 Sep 2020 00:57)    See preop testing chart H&P    Interval event for past 24 hr:  BERTO HEIN  50y had no event.     Current Complains:  BERTO HEIN has no new complaints    REVIEW OF SYSTEMS:  CONSTITUTIONAL:  [-] weakness, [-] fevers, [-] chills  EYES/ENT: [-] visual changes, [-] vertigo, [-] throat pain   NECK: [-] pain, [-] stiffness  RESPIRATORY: [-] cough, [-] wheezing, [-] hemoptysis, [-] shortness of breath  CARDIOVASCULAR: [-] chest pain, [-] palpitations, [-] orthopnea  GASTROINTESTINAL:    [-]abdominal pain, [-] nausea, [-] vomiting, [-] hematemesis, [-] diarrhea, [-] constipation, [-] melena, [-] hematochezia.  GENITOURINARY: [-] dysuria, [-] frequency, [-] hematuria  NEUROLOGICAL: [-] numbness, [-] weakness  SKIN: [-] itching, [-] burning, [-] rashes, [-] lesions   All other review of systems is negative unless indicated above.    [  ] Unable to assess ROS because :    OBJECTIVE:  ICU Vital Signs Last 24 Hrs  T(C): 36.7 (15 Sep 2020 00:00), Max: 37.1 (14 Sep 2020 12:00)  T(F): 98 (15 Sep 2020 00:00), Max: 98.7 (14 Sep 2020 12:00)  HR: 98 (15 Sep 2020 06:00) (81 - 113)  BP: 112/65 (15 Sep 2020 06:00) (95/56 - 124/71)  BP(mean): 83 (15 Sep 2020 06:00) (67 - 90)  ABP: --  ABP(mean): --  RR: 18 (15 Sep 2020 06:00) (17 - 23)  SpO2: 93% (15 Sep 2020 06:00) (91% - 99%)      I&O's Summary    13 Sep 2020 07:01  -  14 Sep 2020 07:00  --------------------------------------------------------  IN: 720 mL / OUT: 2525 mL / NET: -1805 mL    14 Sep 2020 07:01  -  15 Sep 2020 06:38  --------------------------------------------------------  IN: 900 mL / OUT: 600 mL / NET: 300 mL      PHYSICAL EXAM:  General: WN/WD NAD    HEENT:     [+] NCAT  [+] EOMI  [-] Conjuctival edema   [-] Icterus   [-] Thrush   [-] ETT  [-] NGT/OGT    Neck:         [+] FROM   [-] JVD     [-] Nodes     [-] Masses    [+] Mid-line trachea    [-] Tracheostomy    Chest:         [-] Sternal click   [-] Sternal drainage   [+] Pacing wires   [-] Chest tubes   [-] SubQ emphysema    Lungs:          [+] CTA   [-] Rhonchi   [-] Rales    [-] Wheezing    [-] Decreased BS    [-] Dullness R L    Cardiac:       [+] S1 [+] S2    [+] RRR   [-] Irregular   [-] S3   [-] S4    [-] Murmurs    [-] Rub    Abdomen:    [+] BS    [+] Soft    [+] Non-tender     [-] Distended    [-] Organomegaly  [-] PEG    Extremities:   [-] Cyanosis U/L   [-] Clubbing  U/L  [-] LE/UE Edema   [+] Capillary refill    [+] Pulses     Neuro:        [+] Awake   [+]  Alert   [-] Confused   [-] Lethargic   [-] Sedated   [-] Generalized Weakness    Skin:        [-] Rashes    [-] Erythema   [+] Normal incisions   [+] IV sites intact   [-] Sacral decubitus    Tubes:  LINES:    CAPILLARY BLOOD GLUCOSE      POCT Blood Glucose.: 115 mg/dL (14 Sep 2020 22:27)    CAPILLARY BLOOD GLUCOSE      POCT Blood Glucose.: 115 mg/dL (14 Sep 2020 22:27)  POCT Blood Glucose.: 132 mg/dL (14 Sep 2020 15:49)  POCT Blood Glucose.: 138 mg/dL (14 Sep 2020 11:21)  POCT Blood Glucose.: 151 mg/dL (14 Sep 2020 06:42)      HOSPITAL MEDICATIONS:  MEDICATIONS  (STANDING):  aspirin 325 milliGRAM(s) Oral daily  atorvastatin 80 milliGRAM(s) Oral at bedtime  chlorhexidine 4% Liquid 1 Application(s) Topical <User Schedule>  dextrose 5%. 1000 milliLiter(s) (50 mL/Hr) IV Continuous <Continuous>  dextrose 50% Injectable 50 milliLiter(s) IV Push every 15 minutes  famotidine    Tablet 20 milliGRAM(s) Oral daily  guaiFENesin  milliGRAM(s) Oral every 12 hours  heparin   Injectable 5000 Unit(s) SubCutaneous every 8 hours  insulin lispro (HumaLOG) corrective regimen sliding scale   SubCutaneous three times a day before meals  ipratropium 17 MICROgram(s) HFA Inhaler 2 Puff(s) Inhalation every 6 hours  isosorbide   mononitrate ER Tablet (IMDUR) 15 milliGRAM(s) Oral daily  magnesium sulfate  IVPB 1 Gram(s) IV Intermittent every 12 hours  metoprolol tartrate 12.5 milliGRAM(s) Oral every 8 hours  polyethylene glycol 3350 17 Gram(s) Oral daily  senna 2 Tablet(s) Oral at bedtime  simethicone 80 milliGRAM(s) Chew every 8 hours  sodium chloride 0.9%. 1000 milliLiter(s) (10 mL/Hr) IV Continuous <Continuous>    MEDICATIONS  (PRN):  dextrose 40% Gel 15 Gram(s) Oral once PRN Blood Glucose LESS THAN 70 milliGRAM(s)/deciliter  fentaNYL    Injectable 25 MICROGram(s) IV Push every 2 hours PRN breakthrough pain  glucagon  Injectable 1 milliGRAM(s) IntraMuscular once PRN Glucose LESS THAN 70 milligrams/deciliter  oxyCODONE    IR 10 milliGRAM(s) Oral every 4 hours PRN Severe Pain (7 - 10)  oxyCODONE    IR 5 milliGRAM(s) Oral every 4 hours PRN Moderate Pain (4 - 6)      LABS:                          9.0    5.15  )-----------( 139      ( 15 Sep 2020 02:00 )             27.4     09-15    138  |  101  |  25<H>  ----------------------------<  140<H>  3.7   |  29  |  0.8    Ca    8.6      15 Sep 2020 02:00  Mg     1.9     09-15              RADIOLOGY:  Reviewed and interpreted by me  CXR from 09-15-20 shows [+] mild congestion, [-] pneumothorax, [-] R/L effusion, [-] cardiomegaly,       ECG:  Reviewed and interpreted by me:   QTC:    Assessment:  CAD SP CABG  Acute blood loss anemia    PAST MEDICAL & SURGICAL HISTORY:  No pertinent past medical history    H/O shoulder surgery        PLAN:  Neuro: Pain control  Pulm: Encourage coughing, deep breathing and use of incentive spirometry. Wean off supplemental oxygen as able. Daily CXR.   Cardio: Monitor telemetry/alarms. Continue cardiac meds  GI: Tolerating diet. Continue stool softeners. Continue GI prophylaxis  Renal: monitor urine output, supplement electrolytes as needed  Vasc: Heparin SC/SCDs for DVT prophylaxis  Heme: Monitor H/H.   ID: Off antibiotics. Stable.  Endocrine: Monitor finger stick blood sugar and control hyperglycemia with insulin  Physical Therapy: OOB/ambulate        Discussed with Cardiothoracic Team at AM rounds.

## 2020-09-15 NOTE — DISCHARGE NOTE PROVIDER - CARE PROVIDER_API CALL
Seth Ortega  SURGERY  [address] [place] [address], [address]  [place] [address]  Phone: [phone]  Fax: [phone]  Scheduled Appointment: 09/23/2020 01:30 AM    Anali Lyon  [id number]  [address] [place] [address]  Phone: [phone]  Fax: [phone]  Follow Up Time: 1 month    Tahir German  CARDIOVASCULAR DISEASE  [address] [place] [address], [place] [address]  Phone: [phone]  Fax: [phone]  Follow Up Time: 2 weeks

## 2020-09-15 NOTE — DISCHARGE NOTE PROVIDER - PROVIDER TOKENS
PROVIDER:[TOKEN:[[id number]],SCHEDULEDAPPT:[09/23/2020],SCHEDULEDAPPTTIME:[01:30 AM]],PROVIDER:[TOKEN:[[id number]],FOLLOWUP:[1 month]],PROVIDER:[TOKEN:[[id number]],FOLLOWUP:[2 weeks]]

## 2020-09-15 NOTE — DISCHARGE NOTE PROVIDER - HOSPITAL COURSE
50 year old male non smoker with no significant PMHx. Patient presented day of presentation when the patient experienced the sudden onset of substernal chest pain. The pain is exertional, retrosternal, and radiates down the left arm. At the time of the onset of the pain, the patient was exercising on a treadmill. He stopped exercising and waited for the pain to subside. After an hour, the pain persisted so he decided to come to the ED. He has a strong family history of premature heart disease/CAD as well.   In the ED, the patient was hypertensive to 180/102. EKG revealed diffuse ST-depressions in the anterior leads concerning for a posterior wall MI. Cardiology was called and took the patient emergently to the cath lab where it was discovered that he had significant triple-vessel CAD with 90% stenosis of the ostium of the LAD with 99% stenosis in the proximal third of the vessel, likely the culprit lesion. He was placed on a heparin drip and nitro drip for chest pain. Patient was admitted to CCU for monitoring transferred to CTU for CABG. On 9/11/2020 patient underwent CABGx4 w/left radial harvest. Post-operative hospital course was significant for right apical pneumothorax after pleural tubes were removed. Patient remained stable and right pneumothorax improved. Patient remained hemodynamically stable and was discharged home on POD#4.

## 2020-09-15 NOTE — DISCHARGE NOTE PROVIDER - NSDCACTIVITY_GEN_ALL_CORE
No heavy lifting/straining/Stairs allowed/Walking - Outdoors allowed/Walking - Indoors allowed/Showering allowed/Do not make important decisions

## 2020-09-15 NOTE — DISCHARGE NOTE PROVIDER - NSDCMRMEDTOKEN_GEN_ALL_CORE_FT
aspirin 325 mg oral tablet: 1 tab(s) orally once a day  atorvastatin 80 mg oral tablet: 1 tab(s) orally once a day (at bedtime)  famotidine 20 mg oral tablet: 1 tab(s) orally once a day  guaiFENesin 600 mg oral tablet, extended release: 1 tab(s) orally every 12 hours  isosorbide mononitrate 30 mg oral tablet, extended release: 0.5 tab(s) orally once a day (in the morning)   K-Tab 20 mEq oral tablet, extended release: 1 tab(s) orally once a day   Lasix 40 mg oral tablet: 1 tab(s) orally once a day   Metoprolol Tartrate 25 mg oral tablet: 0.5 tab(s) orally 2 times a day   oxycodone-acetaminophen 5 mg-325 mg oral tablet: 1 tab(s) orally every 6 hours, As Needed -for moderate pain MDD:4   polyethylene glycol 3350 oral powder for reconstitution: 17 gram(s) orally once a day

## 2020-09-15 NOTE — PROGRESS NOTE ADULT - SUBJECTIVE AND OBJECTIVE BOX
OPERATIVE PROCEDURE(s): CABGx4 + left radial harvest               POD #  4                     SURGEON(s): VEENA Ortega  SUBJECTIVE ASSESSMENT: 50y Male patient seen and examined at bedside. Patient denies any acute complaints at this time.     Vital Signs Last 24 Hrs  T(F): 99 (15 Sep 2020 08:00), Max: 99 (15 Sep 2020 08:00)  HR: 102 (15 Sep 2020 08:00) (87 - 113)  BP: 108/59 (15 Sep 2020 08:00) (95/56 - 124/71)  BP(mean): 83 (15 Sep 2020 06:00) (67 - 90)  RR: 18 (15 Sep 2020 08:00) (17 - 23)  SpO2: 94% (15 Sep 2020 08:00) (91% - 96%)    I&O's Detail    14 Sep 2020 07:01  -  15 Sep 2020 07:00  --------------------------------------------------------  IN:    IV PiggyBack: 100 mL    Oral Fluid: 800 mL  Total IN: 900 mL    OUT:    Voided (mL): 600 mL  Total OUT: 600 mL    Net: I&O's Detail    13 Sep 2020 07:01  -  14 Sep 2020 07:00  --------------------------------------------------------  Total NET: -1805 mL    14 Sep 2020 07:01  -  15 Sep 2020 07:00  --------------------------------------------------------  Total NET: 300 mL      CAPILLARY BLOOD GLUCOSE  POCT Blood Glucose.: 118 mg/dL (15 Sep 2020 06:50)  POCT Blood Glucose.: 115 mg/dL (14 Sep 2020 22:27)  POCT Blood Glucose.: 132 mg/dL (14 Sep 2020 15:49)  POCT Blood Glucose.: 138 mg/dL (14 Sep 2020 11:21)      Physical Exam:  General: NAD; A&Ox3  Cardiac: S1/S2, RRR, no murmur, no rubs  Lungs: decreased bs at bases bilaterally  Abdomen: Soft/NT/ND; positive bowel sounds x 4  Sternum: Intact, no click, incision healing well with no drainage  Incisions: Incisions clean/dry/intact  Extremities: No edema b/l lower extremities; good capillary refill; no cyanosis; palpable 1+ pedal pulses b/l    EPICARDIAL WIRES:  [x] YES [] NO                                              Day #4  BOWEL MOVEMENT:  [] YES [x] NO, If No, Timing since last BM:      Day #       LABS:                        9.0<L>  5.15  )-----------( 139      ( 15 Sep 2020 02:00 )             27.4<L>                        9.4<L>  7.38  )-----------( 123<L>    ( 14 Sep 2020 01:30 )             29.8<L>    09-15  138  |  101  |  25<H>  ----------------------------<  140<H>  3.7   |  29  |  0.8    09-14  136  |  99  |  25<H>  ----------------------------<  120<H>  4.1   |  31  |  1.0    Ca    8.6      15 Sep 2020 02:00  Mg     1.9     09-15    TPro  5.6<L> [6.0 - 8.0]  /  Alb  4.2 [3.5 - 5.2]  /  TBili  0.9 [0.2 - 1.2]  /  DBili  x   /  AST  43<H> [0 - 41]  /  ALT  20 [0 - 41]  /  AlkPhos  57 [30 - 115]  09-13    A1C with Estimated Average Glucose Result: 5.1 % (09-10-20 @ 04:22)      Allergies  No Known Allergies  Intolerances      MEDICATIONS  (STANDING):  aspirin 325 milliGRAM(s) Oral daily  atorvastatin 80 milliGRAM(s) Oral at bedtime  chlorhexidine 4% Liquid 1 Application(s) Topical <User Schedule>  dextrose 5%. 1000 milliLiter(s) (50 mL/Hr) IV Continuous <Continuous>  dextrose 50% Injectable 50 milliLiter(s) IV Push every 15 minutes  famotidine    Tablet 20 milliGRAM(s) Oral daily  guaiFENesin  milliGRAM(s) Oral every 12 hours  heparin   Injectable 5000 Unit(s) SubCutaneous every 8 hours  insulin lispro (HumaLOG) corrective regimen sliding scale   SubCutaneous three times a day before meals  ipratropium 17 MICROgram(s) HFA Inhaler 2 Puff(s) Inhalation every 6 hours  isosorbide   mononitrate ER Tablet (IMDUR) 15 milliGRAM(s) Oral daily  magnesium sulfate  IVPB 1 Gram(s) IV Intermittent every 12 hours  metoprolol tartrate 12.5 milliGRAM(s) Oral every 8 hours  polyethylene glycol 3350 17 Gram(s) Oral daily  senna 2 Tablet(s) Oral at bedtime  simethicone 80 milliGRAM(s) Chew every 8 hours  sodium chloride 0.9%. 1000 milliLiter(s) (10 mL/Hr) IV Continuous <Continuous>    MEDICATIONS  (PRN):  dextrose 40% Gel 15 Gram(s) Oral once PRN Blood Glucose LESS THAN 70 milliGRAM(s)/deciliter  fentaNYL    Injectable 25 MICROGram(s) IV Push every 2 hours PRN breakthrough pain  glucagon  Injectable 1 milliGRAM(s) IntraMuscular once PRN Glucose LESS THAN 70 milligrams/deciliter  oxyCODONE    IR 10 milliGRAM(s) Oral every 4 hours PRN Severe Pain (7 - 10)  oxyCODONE    IR 5 milliGRAM(s) Oral every 4 hours PRN Moderate Pain (4 - 6)      Post-Op Beta-Blockers: [x] Yes, [] No: contraindication:  Post-Op Aspirin: [x] Yes, [] No: contraindication:  Post-Op Statin: [x] Yes, [] No: contraindication:      Assessment/Plan:  50y Male status-post CABGx4/ left radial harvest POD#4  - Case and plan discussed with CTU Intensivist and CT Surgeon - Dr. Ortega  - Continue CTU supportive care    - Continue DVT/GI prophylaxis  - Incentive Spirometry 10 times an hour  - Continue to advance physical activity as tolerated and continue PT/OT as directed  1. CAD: Continue ASA, statin, BB, and imdur, pain control as needed   2. A. Fib prophylaxis: lopressor, mag 1gm bid  3. COPD/Hypoxia:  cont nebs, mucinex, encourage incentive spirometry, wean o2 as tolerated, lasix fluid overload  4. DM/Glucose Control: cont sliding scale  5. right ptx stable, pt o2 sat 99% on RA    Social Service Disposition:  home

## 2020-09-15 NOTE — DISCHARGE NOTE PROVIDER - NSDCCPCAREPLAN_GEN_ALL_CORE_FT
PRINCIPAL DISCHARGE DIAGNOSIS  Diagnosis: STEMI (ST elevation myocardial infarction)  Assessment and Plan of Treatment:

## 2020-09-16 ENCOUNTER — TRANSCRIPTION ENCOUNTER (OUTPATIENT)
Age: 50
End: 2020-09-16

## 2020-09-16 PROBLEM — Z00.00 ENCOUNTER FOR PREVENTIVE HEALTH EXAMINATION: Status: ACTIVE | Noted: 2020-09-16

## 2020-09-16 RX ORDER — POTASSIUM CHLORIDE 20 MEQ
1 PACKET (EA) ORAL
Qty: 3 | Refills: 0
Start: 2020-09-16 | End: 2020-09-18

## 2020-09-16 RX ORDER — FUROSEMIDE 40 MG
1 TABLET ORAL
Qty: 3 | Refills: 0
Start: 2020-09-16 | End: 2020-09-18

## 2020-09-18 ENCOUNTER — OUTPATIENT (OUTPATIENT)
Dept: OUTPATIENT SERVICES | Facility: HOSPITAL | Age: 50
LOS: 1 days | Discharge: HOME | End: 2020-09-18
Payer: COMMERCIAL

## 2020-09-18 ENCOUNTER — APPOINTMENT (OUTPATIENT)
Dept: CARE COORDINATION | Facility: HOME HEALTH | Age: 50
End: 2020-09-18
Payer: COMMERCIAL

## 2020-09-18 ENCOUNTER — APPOINTMENT (OUTPATIENT)
Dept: CARDIOTHORACIC SURGERY | Facility: CLINIC | Age: 50
End: 2020-09-18

## 2020-09-18 VITALS
RESPIRATION RATE: 12 BRPM | DIASTOLIC BLOOD PRESSURE: 79 MMHG | OXYGEN SATURATION: 94 % | SYSTOLIC BLOOD PRESSURE: 114 MMHG | HEART RATE: 92 BPM

## 2020-09-18 DIAGNOSIS — Z98.890 OTHER SPECIFIED POSTPROCEDURAL STATES: Chronic | ICD-10-CM

## 2020-09-18 DIAGNOSIS — Z95.1 PRESENCE OF AORTOCORONARY BYPASS GRAFT: ICD-10-CM

## 2020-09-18 PROCEDURE — 99024 POSTOP FOLLOW-UP VISIT: CPT

## 2020-09-18 PROCEDURE — 71046 X-RAY EXAM CHEST 2 VIEWS: CPT | Mod: 26

## 2020-09-18 NOTE — ASSESSMENT
[FreeTextEntry1] : Overall patient is progressing well.  Will continue lasix at 20 mg QD x 3 days for +HESS/LE edema.\par \par Education\par 1.  DC instructions reviewed with patient - discussed importance of medications (indication, schedule, side effects, and importance of compliance reviewed) and f/u appointments.\par 2.  Clean incision sites daily - shower indirectly, clean with soap and water, pat dry.  Avoid the use of lotions, ointments, powders, and perfumes on or around incision sites.\par 3.  Sternal precautions - avoid any heavy lifting (>5-10 lbs x 8 weeks), raising both arms above head simultaneously.\par 4.  Place TEDs on in AM prior to getting out of bed and off in PM when returning to bed.\par 5.  Follow a heart healthy diet - low salt, low fat.\par 6.  Exercise daily.\par 7.  Monitor and call FY NP for signs and symptoms of infection (redness, drainage, and fever).\par 8.  Monitor daily weights (call for a gain of 2-3 lbs/day or 5-6 lbs/week). \par 9.  Blood sugar control necessary for wound healing if applicable.\par 10.  Avoid straining during BM - use stool softners as needed. \par 11.  Instructed on importance of incentive spirometry use (10x/hour).\par \par Patient verbalized understanding of all education outlined above. Pt instructed to call FY NP for any issues as delineated above.\par \par PLAN\par 1.  Monitor daily weights\par 2.  Continue current medications as prescribed\par 3.  Incentive spirometry use\par 4.  Maintain sternal precautions\par 5.  Exercise daily\par 5.  Maintain HH diet\par 6.  Maintain TEDs\par 7.  Keep legs elevated\par 8.  F/U appointments - \par CTSx Shannon 9/23\par Cardio TBD - considering using Dr. Rollins\par PMD Camron TBD\par 9.  FY NP will continue to f/u with patient status - Pt agrees to call with any questions/concerns\par 10. To recover without complications.\par

## 2020-09-18 NOTE — HISTORY OF PRESENT ILLNESS
[Home] : at home, [unfilled] , at the time of the visit. [Other Location: e.g. Home (Enter Location, City,State)___] : at [unfilled] [Verbal consent obtained from patient] : the patient, [unfilled] [FreeTextEntry1] : FOLLOW YOUR HEART\par \par Hospital Course	 \par 50 year old male non smoker with no significant PMHx. Patient presented day of \par presentation when the patient experienced the sudden onset of substernal chest \par pain. The pain is exertional, retrosternal, and radiates down the left arm. At \par the time of the onset of the pain, the patient was exercising on a treadmill. \par He stopped exercising and waited for the pain to subside. After an hour, the \par pain persisted so he decided to come to the ED. He has a strong family history \par of premature heart disease/CAD as well. \par In the ED, the patient was hypertensive to 180/102. EKG revealed diffuse \par ST-depressions in the anterior leads concerning for a posterior wall MI. \par Cardiology was called and took the patient emergently to the cath lab where it \par was discovered that he had significant triple-vessel CAD with 90% stenosis of \par the ostium of the LAD with 99% stenosis in the proximal third of the vessel, \par likely the culprit lesion. He was placed on a heparin drip and nitro drip for \par chest pain. Patient was admitted to CCU for monitoring transferred to CTU for \par CABG. On 9/11/2020 patient underwent CABGx4 w/left radial harvest. \par Post-operative hospital course was significant for right apical pneumothorax \par after pleural tubes were removed. Patient remained stable and right \par pneumothorax improved. Patient remained hemodynamically stable and was \par discharged home on POD#4. \par \par Patient appears comfortable.  He reports +HESS and sinus congestion which he states are related to his allergies.  Patient requesting to take Zyrtec - patient instructed he may take as needed.  He reports he has been measuring his weight daily which has been stable at 155lbs.  He has completed his 3 day course of lasix at this time.

## 2020-09-18 NOTE — PHYSICAL EXAM
[] : no respiratory distress [Examination Of The Chest] : the chest was normal in appearance [Oriented To Time, Place, And Person] : oriented to person, place, and time [FreeTextEntry1] : ANNIE SVG C/D/I

## 2020-09-19 ENCOUNTER — TRANSCRIPTION ENCOUNTER (OUTPATIENT)
Age: 50
End: 2020-09-19

## 2020-09-19 ENCOUNTER — EMERGENCY (EMERGENCY)
Facility: HOSPITAL | Age: 50
LOS: 0 days | Discharge: HOME | End: 2020-09-19
Attending: EMERGENCY MEDICINE | Admitting: EMERGENCY MEDICINE
Payer: COMMERCIAL

## 2020-09-19 VITALS
DIASTOLIC BLOOD PRESSURE: 77 MMHG | HEART RATE: 90 BPM | HEIGHT: 65 IN | OXYGEN SATURATION: 96 % | SYSTOLIC BLOOD PRESSURE: 120 MMHG | TEMPERATURE: 98 F | RESPIRATION RATE: 18 BRPM

## 2020-09-19 DIAGNOSIS — Z79.82 LONG TERM (CURRENT) USE OF ASPIRIN: ICD-10-CM

## 2020-09-19 DIAGNOSIS — I25.10 ATHEROSCLEROTIC HEART DISEASE OF NATIVE CORONARY ARTERY WITHOUT ANGINA PECTORIS: ICD-10-CM

## 2020-09-19 DIAGNOSIS — Z95.1 PRESENCE OF AORTOCORONARY BYPASS GRAFT: ICD-10-CM

## 2020-09-19 DIAGNOSIS — Z79.899 OTHER LONG TERM (CURRENT) DRUG THERAPY: ICD-10-CM

## 2020-09-19 DIAGNOSIS — Z98.890 OTHER SPECIFIED POSTPROCEDURAL STATES: Chronic | ICD-10-CM

## 2020-09-19 DIAGNOSIS — R33.9 RETENTION OF URINE, UNSPECIFIED: ICD-10-CM

## 2020-09-19 LAB
ALBUMIN SERPL ELPH-MCNC: 3.9 G/DL — SIGNIFICANT CHANGE UP (ref 3.5–5.2)
ALP SERPL-CCNC: 257 U/L — HIGH (ref 30–115)
ALT FLD-CCNC: 44 U/L — HIGH (ref 0–41)
ANION GAP SERPL CALC-SCNC: 9 MMOL/L — SIGNIFICANT CHANGE UP (ref 7–14)
ANISOCYTOSIS BLD QL: SLIGHT — SIGNIFICANT CHANGE UP
APPEARANCE UR: CLEAR — SIGNIFICANT CHANGE UP
AST SERPL-CCNC: 41 U/L — SIGNIFICANT CHANGE UP (ref 0–41)
BASOPHILS # BLD AUTO: 0.04 K/UL — SIGNIFICANT CHANGE UP (ref 0–0.2)
BASOPHILS NFR BLD AUTO: 0.9 % — SIGNIFICANT CHANGE UP (ref 0–1)
BILIRUB DIRECT SERPL-MCNC: <0.2 MG/DL — SIGNIFICANT CHANGE UP (ref 0–0.2)
BILIRUB INDIRECT FLD-MCNC: >0.4 MG/DL — SIGNIFICANT CHANGE UP (ref 0.2–1.2)
BILIRUB SERPL-MCNC: 0.6 MG/DL — SIGNIFICANT CHANGE UP (ref 0.2–1.2)
BILIRUB UR-MCNC: NEGATIVE — SIGNIFICANT CHANGE UP
BUN SERPL-MCNC: 27 MG/DL — HIGH (ref 10–20)
CALCIUM SERPL-MCNC: 8.9 MG/DL — SIGNIFICANT CHANGE UP (ref 8.5–10.1)
CHLORIDE SERPL-SCNC: 101 MMOL/L — SIGNIFICANT CHANGE UP (ref 98–110)
CO2 SERPL-SCNC: 27 MMOL/L — SIGNIFICANT CHANGE UP (ref 17–32)
COLOR SPEC: YELLOW — SIGNIFICANT CHANGE UP
CREAT SERPL-MCNC: 1.1 MG/DL — SIGNIFICANT CHANGE UP (ref 0.7–1.5)
DIFF PNL FLD: NEGATIVE — SIGNIFICANT CHANGE UP
EOSINOPHIL # BLD AUTO: 0.14 K/UL — SIGNIFICANT CHANGE UP (ref 0–0.7)
EOSINOPHIL NFR BLD AUTO: 3.5 % — SIGNIFICANT CHANGE UP (ref 0–8)
GLUCOSE SERPL-MCNC: 104 MG/DL — HIGH (ref 70–99)
GLUCOSE UR QL: NEGATIVE — SIGNIFICANT CHANGE UP
HCT VFR BLD CALC: 30.6 % — LOW (ref 42–52)
HGB BLD-MCNC: 9.6 G/DL — LOW (ref 14–18)
KETONES UR-MCNC: NEGATIVE — SIGNIFICANT CHANGE UP
LEUKOCYTE ESTERASE UR-ACNC: NEGATIVE — SIGNIFICANT CHANGE UP
LIDOCAIN IGE QN: 161 U/L — HIGH (ref 7–60)
LYMPHOCYTES # BLD AUTO: 0.32 K/UL — LOW (ref 1.2–3.4)
LYMPHOCYTES # BLD AUTO: 7.8 % — LOW (ref 20.5–51.1)
MACROCYTES BLD QL: SLIGHT — SIGNIFICANT CHANGE UP
MANUAL SMEAR VERIFICATION: SIGNIFICANT CHANGE UP
MCHC RBC-ENTMCNC: 27.4 PG — SIGNIFICANT CHANGE UP (ref 27–31)
MCHC RBC-ENTMCNC: 31.4 G/DL — LOW (ref 32–37)
MCV RBC AUTO: 87.4 FL — SIGNIFICANT CHANGE UP (ref 80–94)
MONOCYTES # BLD AUTO: 0.5 K/UL — SIGNIFICANT CHANGE UP (ref 0.1–0.6)
MONOCYTES NFR BLD AUTO: 12.2 % — HIGH (ref 1.7–9.3)
NEUTROPHILS # BLD AUTO: 2.96 K/UL — SIGNIFICANT CHANGE UP (ref 1.4–6.5)
NEUTROPHILS NFR BLD AUTO: 70.4 % — SIGNIFICANT CHANGE UP (ref 42.2–75.2)
NEUTS BAND # BLD: 2.6 % — SIGNIFICANT CHANGE UP (ref 0–6)
NITRITE UR-MCNC: NEGATIVE — SIGNIFICANT CHANGE UP
PH UR: 6.5 — SIGNIFICANT CHANGE UP (ref 5–8)
PLAT MORPH BLD: NORMAL — SIGNIFICANT CHANGE UP
PLATELET # BLD AUTO: 298 K/UL — SIGNIFICANT CHANGE UP (ref 130–400)
POIKILOCYTOSIS BLD QL AUTO: SLIGHT — SIGNIFICANT CHANGE UP
POLYCHROMASIA BLD QL SMEAR: SLIGHT — SIGNIFICANT CHANGE UP
POTASSIUM SERPL-MCNC: 4.6 MMOL/L — SIGNIFICANT CHANGE UP (ref 3.5–5)
POTASSIUM SERPL-SCNC: 4.6 MMOL/L — SIGNIFICANT CHANGE UP (ref 3.5–5)
PROT SERPL-MCNC: 6.1 G/DL — SIGNIFICANT CHANGE UP (ref 6–8)
PROT UR-MCNC: SIGNIFICANT CHANGE UP
RBC # BLD: 3.5 M/UL — LOW (ref 4.7–6.1)
RBC # FLD: 12.8 % — SIGNIFICANT CHANGE UP (ref 11.5–14.5)
RBC BLD AUTO: ABNORMAL
SODIUM SERPL-SCNC: 137 MMOL/L — SIGNIFICANT CHANGE UP (ref 135–146)
SP GR SPEC: 1.02 — SIGNIFICANT CHANGE UP (ref 1.01–1.03)
UROBILINOGEN FLD QL: SIGNIFICANT CHANGE UP
VARIANT LYMPHS # BLD: 2.6 % — SIGNIFICANT CHANGE UP (ref 0–5)
WBC # BLD: 4.06 K/UL — LOW (ref 4.8–10.8)
WBC # FLD AUTO: 4.06 K/UL — LOW (ref 4.8–10.8)

## 2020-09-19 PROCEDURE — 71045 X-RAY EXAM CHEST 1 VIEW: CPT | Mod: 26

## 2020-09-19 PROCEDURE — 74177 CT ABD & PELVIS W/CONTRAST: CPT | Mod: 26

## 2020-09-19 PROCEDURE — 93010 ELECTROCARDIOGRAM REPORT: CPT

## 2020-09-19 PROCEDURE — 99285 EMERGENCY DEPT VISIT HI MDM: CPT

## 2020-09-19 RX ORDER — SODIUM CHLORIDE 9 MG/ML
1000 INJECTION, SOLUTION INTRAVENOUS ONCE
Refills: 0 | Status: COMPLETED | OUTPATIENT
Start: 2020-09-19 | End: 2020-09-19

## 2020-09-19 RX ORDER — FAMOTIDINE 10 MG/ML
20 INJECTION INTRAVENOUS ONCE
Refills: 0 | Status: COMPLETED | OUTPATIENT
Start: 2020-09-19 | End: 2020-09-19

## 2020-09-19 RX ADMIN — SODIUM CHLORIDE 1000 MILLILITER(S): 9 INJECTION, SOLUTION INTRAVENOUS at 15:20

## 2020-09-19 NOTE — ED PROVIDER NOTE - CARE PROVIDER_API CALL
Abdi Camacho  UROLOGY  [address] [place] [address], [address]  [place] [address]  Phone: [phone]  Fax: [phone]  Follow Up Time:

## 2020-09-19 NOTE — ED PROVIDER NOTE - ATTENDING CONTRIBUTION TO CARE
50 y.o. M, PMH of kidney stones, CAD s/p Coronary Bypass by Dr. Ortega on 9/11/2020, comes in c/o right sided flank pain and suprapubic pressure which started earlier today. Reports nausea, no vomiting. No CP/SOB. No abdominal pain. Pt states pain improved prior to arrival. Pt looks comfortable now. On exam, pt in NAD, AAOx3, head NC/AT, CN II-XII intact, lungs CTA B/L, CV S1S2 regular, abdomen soft/NT/ND/(+)BS, back (+) soreness to right flank, ext (-) edema, motor 5/5x4, sensation intact. Will do labs, CT, UA, will notify CT surgery about pt and reevaluate.

## 2020-09-19 NOTE — ED ADULT NURSE NOTE - OBJECTIVE STATEMENT
51 y/o male came in for right flank pain, pt stated, "I think I passed a kidney stone, but I'm not sure".

## 2020-09-19 NOTE — ED CLERICAL - NS ED CLERK NOTE PRE-ARRIVAL INFORMATION; ADDITIONAL PRE-ARRIVAL INFORMATION
This patient is enrolled in the Follow Your Heart program and has undergone a cardiac surgery procedure within the last 30 days and has active care navigation. This patient can be followed up by the care navigation team within 24  hours. To arrange close follow-up or to obtain additional clinical information about this patient, please call the contact number above. Please call the cardiac surgery team once patient is registered at [phone] for consultation PRIOR to disposition  decision.  The patient recently underwent a cardiac surgery procedure and the team can assist in acute medical management.

## 2020-09-19 NOTE — ED PROVIDER NOTE - CLINICAL SUMMARY MEDICAL DECISION MAKING FREE TEXT BOX
Pt with distal kidney stone on CT. No pain at this time. Wants to go home. States has pain medicine left over from surgery. CT surgery evaluated pt, agree with d/c. Will d/c pt with strainer and urology follow up.

## 2020-09-19 NOTE — ED PROVIDER NOTE - OBJECTIVE STATEMENT
50 y.o. M with PMH of kidney stones, CAD s/p Coronary Bypass by Dr. Ortega pw today with urinary retention and right sided CVA tenderness. Nausea without vomitting, nodiarrhea. + urinary frequency. + suprapubic pain,not improved with positioning, dull radiating to the suprapubic region. No fever no chills. no CP no SOB.

## 2020-09-19 NOTE — ED PROVIDER NOTE - NS ED ROS FT
Constitutional:  See HPI.   Eyes:  No visual changes, eye pain or discharge.  ENMT:  No hearing changes, pain, discharge or infections. No neck pain or stiffness.  Cardiac:  No chest pain, SOB or edema. No chest pain with exertion.  Respiratory:  No cough or respiratory distress. No hemoptysis.  GI:  No nausea, vomiting, diarrhea, abdominal pain.  :  No dysuria, + frequency, hematuria  MS:  No joint pain, + Right sided back pain.  Neuro:  No LOC. No headache or weakness.    Skin:  No skin rash.  Except as in HPI, all other review of systems is negative

## 2020-09-19 NOTE — ED PROVIDER NOTE - PHYSICAL EXAMINATION
CONSTITUTIONAL: Well-developed; well-nourished; in no acute distress.   SKIN: warm, dry  HEAD: Normocephalic; atraumatic.  EYES: PERRL, EOMI, no conjunctival erythema  ENT: No nasal discharge; airway clear.  NECK: Supple; non tender.  CARD: S1, S2 normal; no murmurs, gallops, or rubs. Regular rate and rhythm.   RESP: No wheezes, rales or rhonchi.  ABD: Suprapubic tendenress, + R CVA tendenress  EXT: Normal ROM.  No clubbing, cyanosis or edema.   LYMPH: No acute cervical adenopathy.  NEURO: Alert, oriented, grossly unremarkable  PSYCH: Cooperative, appropriate.

## 2020-09-19 NOTE — ED PROVIDER NOTE - PATIENT PORTAL LINK FT
You can access the FollowMyHealth Patient Portal offered by [facility] by registering at the following website: [email or web address]. By joining [employer]’s FollowMyHealth portal, you will also be able to view your health information using other applications (apps) compatible with our system.

## 2020-09-20 LAB
CULTURE RESULTS: NO GROWTH — SIGNIFICANT CHANGE UP
SPECIMEN SOURCE: SIGNIFICANT CHANGE UP

## 2020-09-23 ENCOUNTER — APPOINTMENT (OUTPATIENT)
Dept: CARDIOTHORACIC SURGERY | Facility: CLINIC | Age: 50
End: 2020-09-23
Payer: COMMERCIAL

## 2020-09-23 VITALS
HEIGHT: 65 IN | DIASTOLIC BLOOD PRESSURE: 77 MMHG | WEIGHT: 155 LBS | BODY MASS INDEX: 25.83 KG/M2 | HEART RATE: 92 BPM | RESPIRATION RATE: 18 BRPM | OXYGEN SATURATION: 92 % | SYSTOLIC BLOOD PRESSURE: 122 MMHG | TEMPERATURE: 98.4 F

## 2020-09-23 PROCEDURE — 99024 POSTOP FOLLOW-UP VISIT: CPT

## 2020-09-23 RX ORDER — POTASSIUM CHLORIDE 750 MG/1
10 TABLET, FILM COATED, EXTENDED RELEASE ORAL DAILY
Qty: 3 | Refills: 0 | Status: DISCONTINUED | COMMUNITY
Start: 2020-09-18 | End: 2020-09-23

## 2020-09-23 RX ORDER — OXYCODONE AND ACETAMINOPHEN 5; 325 MG/1; MG/1
5-325 TABLET ORAL
Refills: 0 | Status: DISCONTINUED | COMMUNITY
Start: 2020-09-16 | End: 2020-09-23

## 2020-09-23 RX ORDER — AMOXICILLIN AND CLAVULANATE POTASSIUM 875; 125 MG/1; MG/1
875-125 TABLET, COATED ORAL
Qty: 20 | Refills: 0 | Status: DISCONTINUED | COMMUNITY
Start: 2020-09-23 | End: 2020-09-23

## 2020-09-23 RX ORDER — POTASSIUM CHLORIDE 1500 MG/1
20 TABLET, FILM COATED, EXTENDED RELEASE ORAL
Refills: 0 | Status: DISCONTINUED | COMMUNITY
Start: 2020-09-16 | End: 2020-09-23

## 2020-09-23 RX ORDER — FUROSEMIDE 40 MG/1
40 TABLET ORAL
Refills: 0 | Status: DISCONTINUED | COMMUNITY
Start: 2020-09-16 | End: 2020-09-23

## 2020-09-23 RX ORDER — POLYETHYLENE GLYCOL 3350 17 G/17G
17 POWDER, FOR SOLUTION ORAL
Refills: 0 | Status: DISCONTINUED | COMMUNITY
Start: 2020-09-16 | End: 2020-09-23

## 2020-09-23 RX ORDER — FUROSEMIDE 20 MG/1
20 TABLET ORAL DAILY
Qty: 3 | Refills: 0 | Status: DISCONTINUED | COMMUNITY
Start: 2020-09-18 | End: 2020-09-23

## 2020-09-30 ENCOUNTER — APPOINTMENT (OUTPATIENT)
Dept: CARDIOTHORACIC SURGERY | Facility: CLINIC | Age: 50
End: 2020-09-30
Payer: COMMERCIAL

## 2020-09-30 VITALS
HEART RATE: 60 BPM | TEMPERATURE: 99 F | RESPIRATION RATE: 12 BRPM | WEIGHT: 155 LBS | DIASTOLIC BLOOD PRESSURE: 74 MMHG | BODY MASS INDEX: 25.83 KG/M2 | OXYGEN SATURATION: 96 % | SYSTOLIC BLOOD PRESSURE: 113 MMHG | HEIGHT: 65 IN

## 2020-09-30 PROCEDURE — 99024 POSTOP FOLLOW-UP VISIT: CPT

## 2020-10-08 RX ORDER — METOPROLOL TARTRATE 25 MG/1
25 TABLET, FILM COATED ORAL
Refills: 0 | Status: DISCONTINUED | COMMUNITY
Start: 2020-09-16 | End: 2020-10-08

## 2020-10-08 RX ORDER — AMOXICILLIN 500 MG/1
500 TABLET, FILM COATED ORAL
Qty: 20 | Refills: 0 | Status: DISCONTINUED | COMMUNITY
Start: 2020-09-23 | End: 2020-10-08

## 2020-10-16 ENCOUNTER — APPOINTMENT (OUTPATIENT)
Dept: CARDIOLOGY | Facility: CLINIC | Age: 50
End: 2020-10-16
Payer: COMMERCIAL

## 2020-10-16 ENCOUNTER — TRANSCRIPTION ENCOUNTER (OUTPATIENT)
Age: 50
End: 2020-10-16

## 2020-10-16 VITALS
TEMPERATURE: 97.7 F | WEIGHT: 150 LBS | SYSTOLIC BLOOD PRESSURE: 110 MMHG | DIASTOLIC BLOOD PRESSURE: 70 MMHG | HEIGHT: 65 IN | HEART RATE: 58 BPM | BODY MASS INDEX: 24.99 KG/M2

## 2020-10-16 DIAGNOSIS — I25.2 OLD MYOCARDIAL INFARCTION: ICD-10-CM

## 2020-10-16 PROCEDURE — 99214 OFFICE O/P EST MOD 30 MIN: CPT

## 2020-10-16 PROCEDURE — 93000 ELECTROCARDIOGRAM COMPLETE: CPT

## 2020-10-16 RX ORDER — ISOSORBIDE MONONITRATE 30 MG/1
30 TABLET, EXTENDED RELEASE ORAL DAILY
Refills: 0 | Status: DISCONTINUED | COMMUNITY
Start: 2020-09-16 | End: 2020-10-16

## 2020-10-16 RX ORDER — OXYCODONE AND ACETAMINOPHEN 5; 325 MG/1; MG/1
5-325 TABLET ORAL
Qty: 14 | Refills: 0 | Status: DISCONTINUED | COMMUNITY
Start: 2020-09-17 | End: 2020-10-16

## 2020-10-16 RX ORDER — GUAIFENESIN 600 MG/1
600 TABLET, EXTENDED RELEASE ORAL
Refills: 0 | Status: DISCONTINUED | COMMUNITY
Start: 2020-09-16 | End: 2020-10-16

## 2020-10-16 NOTE — PHYSICAL EXAM
[General Appearance - Well Developed] : well developed [Normal Appearance] : normal appearance [Well Groomed] : well groomed [General Appearance - Well Nourished] : well nourished [General Appearance - In No Acute Distress] : no acute distress [No Deformities] : no deformities [Normal Oral Mucosa] : normal oral mucosa [No Oral Cyanosis] : no oral cyanosis [No Oral Pallor] : no oral pallor [Normal Jugular Venous V Waves Present] : normal jugular venous V waves present [No Jugular Venous Valle A Waves] : no jugular venous valel A waves [Normal Jugular Venous A Waves Present] : normal jugular venous A waves present [Respiration, Rhythm And Depth] : normal respiratory rhythm and effort [Heart Rate And Rhythm] : heart rate and rhythm were normal [Auscultation Breath Sounds / Voice Sounds] : lungs were clear to auscultation bilaterally [Exaggerated Use Of Accessory Muscles For Inspiration] : no accessory muscle use [Murmurs] : no murmurs present [Heart Sounds] : normal S1 and S2 [Abdomen Soft] : soft [Abdomen Tenderness] : non-tender [Cyanosis, Localized] : no localized cyanosis [Nail Clubbing] : no clubbing of the fingernails [Abdomen Mass (___ Cm)] : no abdominal mass palpated [Petechial Hemorrhages (___cm)] : no petechial hemorrhages [] : no ischemic changes [Skin Color & Pigmentation] : normal skin color and pigmentation [Oriented To Time, Place, And Person] : oriented to person, place, and time

## 2020-10-17 NOTE — REASON FOR VISIT
[Follow-Up - From Hospitalization] : follow-up of a recent hospitalization for [FreeTextEntry2] : cad/ami

## 2020-10-17 NOTE — ASSESSMENT
[FreeTextEntry1] : 51 yo male with pmhx and presentation as above\par cad/ami/acbg\par dyslipidemia\par cont meds\par d/c imdur and resume low dose bb\par cont high dose statin for at least 3 months, adjust dose accordingly thereafter\par ECHO/Stress test next visit\par repeat labs\par aggressive risk modif\par diet and act as tolerated\par rtc 4-6 weeks\par

## 2020-10-17 NOTE — HISTORY OF PRESENT ILLNESS
[FreeTextEntry1] : 49 yo male with pmhx as below is here for a f/up visit\par 09/20 admitted to [facility] with ami, w/up revealed sign 3v cad, s/p cabg\par Post d/c course unrem\par Few episodes fo symptomatic low BP, bb was stopped by CTS\par NO active cv complains\par Et is getting better\par Ros is otherwise as below

## 2020-11-02 RX ORDER — ATORVASTATIN CALCIUM 80 MG/1
80 TABLET, FILM COATED ORAL
Qty: 90 | Refills: 3 | Status: DISCONTINUED | COMMUNITY
Start: 2020-09-16 | End: 2020-11-02

## 2020-12-04 ENCOUNTER — APPOINTMENT (OUTPATIENT)
Dept: CARDIOLOGY | Facility: CLINIC | Age: 50
End: 2020-12-04
Payer: COMMERCIAL

## 2020-12-04 VITALS
BODY MASS INDEX: 24.99 KG/M2 | HEIGHT: 65 IN | HEART RATE: 55 BPM | WEIGHT: 150 LBS | TEMPERATURE: 97.6 F | DIASTOLIC BLOOD PRESSURE: 70 MMHG | SYSTOLIC BLOOD PRESSURE: 110 MMHG

## 2020-12-04 PROCEDURE — 99214 OFFICE O/P EST MOD 30 MIN: CPT

## 2020-12-04 PROCEDURE — 99072 ADDL SUPL MATRL&STAF TM PHE: CPT

## 2020-12-04 PROCEDURE — 93000 ELECTROCARDIOGRAM COMPLETE: CPT

## 2020-12-04 NOTE — PHYSICAL EXAM
[General Appearance - Well Developed] : well developed [Normal Appearance] : normal appearance [Well Groomed] : well groomed [General Appearance - Well Nourished] : well nourished [No Deformities] : no deformities [General Appearance - In No Acute Distress] : no acute distress [Normal Oral Mucosa] : normal oral mucosa [No Oral Pallor] : no oral pallor [No Oral Cyanosis] : no oral cyanosis [Normal Jugular Venous A Waves Present] : normal jugular venous A waves present [Normal Jugular Venous V Waves Present] : normal jugular venous V waves present [No Jugular Venous Valle A Waves] : no jugular venous valle A waves [Respiration, Rhythm And Depth] : normal respiratory rhythm and effort [Exaggerated Use Of Accessory Muscles For Inspiration] : no accessory muscle use [Auscultation Breath Sounds / Voice Sounds] : lungs were clear to auscultation bilaterally [Heart Rate And Rhythm] : heart rate and rhythm were normal [Heart Sounds] : normal S1 and S2 [Murmurs] : no murmurs present [Abdomen Soft] : soft [Abdomen Tenderness] : non-tender [Abdomen Mass (___ Cm)] : no abdominal mass palpated [Nail Clubbing] : no clubbing of the fingernails [Cyanosis, Localized] : no localized cyanosis [Petechial Hemorrhages (___cm)] : no petechial hemorrhages [] : no ischemic changes [Skin Color & Pigmentation] : normal skin color and pigmentation [Oriented To Time, Place, And Person] : oriented to person, place, and time

## 2020-12-04 NOTE — ASSESSMENT
[FreeTextEntry1] : 51 yo male with pmhx and presentation as above\par cad/ami/acbg\par dyslipidemia\par cont meds\par cont high dose statin for at least 3 months, adjust dose accordingly thereafter\par ECHO/Stress test \par repeat labs with pmd\par aggressive risk modif\par diet and act as tolerated\par rtc 8-10 weeks\par

## 2020-12-22 ENCOUNTER — RX RENEWAL (OUTPATIENT)
Age: 50
End: 2020-12-22

## 2020-12-28 ENCOUNTER — APPOINTMENT (OUTPATIENT)
Dept: CARDIOLOGY | Facility: CLINIC | Age: 50
End: 2020-12-28
Payer: COMMERCIAL

## 2020-12-28 ENCOUNTER — APPOINTMENT (OUTPATIENT)
Dept: CARDIOLOGY | Facility: CLINIC | Age: 50
End: 2020-12-28

## 2020-12-28 PROCEDURE — 99072 ADDL SUPL MATRL&STAF TM PHE: CPT

## 2020-12-28 PROCEDURE — 93306 TTE W/DOPPLER COMPLETE: CPT

## 2020-12-28 PROCEDURE — 93015 CV STRESS TEST SUPVJ I&R: CPT

## 2021-02-25 ENCOUNTER — APPOINTMENT (OUTPATIENT)
Dept: CARDIOLOGY | Facility: CLINIC | Age: 51
End: 2021-02-25
Payer: COMMERCIAL

## 2021-02-25 PROCEDURE — 93228 REMOTE 30 DAY ECG REV/REPORT: CPT

## 2021-02-25 PROCEDURE — 99072 ADDL SUPL MATRL&STAF TM PHE: CPT

## 2021-03-12 ENCOUNTER — APPOINTMENT (OUTPATIENT)
Dept: CARDIOLOGY | Facility: CLINIC | Age: 51
End: 2021-03-12
Payer: COMMERCIAL

## 2021-03-12 VITALS
WEIGHT: 153 LBS | DIASTOLIC BLOOD PRESSURE: 70 MMHG | HEART RATE: 59 BPM | TEMPERATURE: 98 F | BODY MASS INDEX: 25.49 KG/M2 | SYSTOLIC BLOOD PRESSURE: 116 MMHG | HEIGHT: 65 IN

## 2021-03-12 PROCEDURE — 99072 ADDL SUPL MATRL&STAF TM PHE: CPT

## 2021-03-12 PROCEDURE — 99214 OFFICE O/P EST MOD 30 MIN: CPT

## 2021-03-12 PROCEDURE — 93000 ELECTROCARDIOGRAM COMPLETE: CPT

## 2021-03-12 NOTE — HISTORY OF PRESENT ILLNESS
[FreeTextEntry1] : 51 yo male with pmhx as below is here for a f/up visit\par 09/20 admitted to [facility] with ami, w/up revealed sign 3v cad, s/p cabg\par on low dose bb, doing well\par recent palpitations, s/p mcot\par NO other cv complains\par Et is stable\par Ros is otherwise as below

## 2021-03-12 NOTE — ASSESSMENT
[FreeTextEntry1] : 51 yo male with pmhx and presentation as above\par cad/ami/acbg\par dyslipidemia\par cont meds\par cont high dose statin for at least 3 months, adjust dose accordingly thereafter\par ECHO/Stress test - reviewed, and d/w the pt\par mcot - pvcs\par repeat labs with pmd reviewed\par aggressive risk modif\par diet and act as tolerated\par rtc 3-4 months\par

## 2021-07-16 ENCOUNTER — APPOINTMENT (OUTPATIENT)
Dept: CARDIOLOGY | Facility: CLINIC | Age: 51
End: 2021-07-16
Payer: COMMERCIAL

## 2021-07-16 VITALS
DIASTOLIC BLOOD PRESSURE: 70 MMHG | BODY MASS INDEX: 24.83 KG/M2 | WEIGHT: 149 LBS | TEMPERATURE: 97.9 F | SYSTOLIC BLOOD PRESSURE: 110 MMHG | HEIGHT: 65 IN | HEART RATE: 50 BPM

## 2021-07-16 PROCEDURE — 93000 ELECTROCARDIOGRAM COMPLETE: CPT

## 2021-07-16 PROCEDURE — 99214 OFFICE O/P EST MOD 30 MIN: CPT

## 2021-07-16 RX ORDER — ASPIRIN 325 MG/1
325 TABLET, FILM COATED ORAL DAILY
Qty: 90 | Refills: 3 | Status: DISCONTINUED | COMMUNITY
Start: 2020-09-16 | End: 2021-07-16

## 2021-07-18 NOTE — ASSESSMENT
[FreeTextEntry1] : 52 yo male with pmhx and presentation as above\par cad/ami/acbg\par dyslipidemia\par cont meds\par decrease asa to 81\par labs reviewed, cont statin at he current dose\par repeat stress test next visit\par aggressive risk modif\par diet and act as tolerated\par rtc 6 months\par

## 2021-07-18 NOTE — HISTORY OF PRESENT ILLNESS
[FreeTextEntry1] : 52 yo male with pmhx as below is here for a f/up visit\par 09/20 admitted to [facility] with ami, w/up revealed sign 3v cad, s/p cabg\par on low dose bb, doing well\par NO major cv complains\par Et is stable\par Ros is otherwise as below

## 2021-08-28 NOTE — ED PROVIDER NOTE - NS ED ATTENDING STATEMENT MOD
No
I have personally performed a face to face diagnostic evaluation on this patient. I have reviewed the ACP note and agree with the history, exam and plan of care, except as noted.

## 2021-12-29 NOTE — ED ADULT TRIAGE NOTE - CADM TRG EMS AGENCY
[facility]  [address] [address]  Phone: [phone]  Fax: [phone]    Brandon Deluna MD         December 29, 2021     Patient: Madeline Lema   YOB: 1957   Date of Visit: 12/29/2021       To Whom It May Concern: It is my medical opinion that Madeline Lema requires a disability parking placard for the following reasons:  He has limited walking ability due to a neurologic condition. Duration of need: 10 years or greater  DX: Diabetic Neuropathy, E11.40; Gait Difficulty, R26.9    If you have any questions or concerns, please don't hesitate to call.     Sincerely,        Brandon Deluna MD 11B

## 2022-02-04 ENCOUNTER — APPOINTMENT (OUTPATIENT)
Dept: CARDIOLOGY | Facility: CLINIC | Age: 52
End: 2022-02-04
Payer: COMMERCIAL

## 2022-02-04 VITALS
DIASTOLIC BLOOD PRESSURE: 86 MMHG | HEIGHT: 65 IN | SYSTOLIC BLOOD PRESSURE: 138 MMHG | HEART RATE: 49 BPM | TEMPERATURE: 97.3 F | WEIGHT: 161 LBS | BODY MASS INDEX: 26.82 KG/M2

## 2022-02-04 PROCEDURE — 93000 ELECTROCARDIOGRAM COMPLETE: CPT

## 2022-02-04 PROCEDURE — 99214 OFFICE O/P EST MOD 30 MIN: CPT

## 2022-02-04 NOTE — ASSESSMENT
[FreeTextEntry1] : 52 yo male with pmhx and presentation as above\par cad/ami/acbg\par dyslipidemia\par cont meds\par labs reviewed, cont statin, might add zetia 10 daily\par repeat stress echo\par labs\par aggressive risk modif\par diet and act as tolerated\par rtc 5-6 months\par

## 2022-03-14 ENCOUNTER — EMERGENCY (EMERGENCY)
Facility: HOSPITAL | Age: 52
LOS: 0 days | Discharge: LEFT BEFORE DISCHARGE | End: 2022-03-15
Attending: EMERGENCY MEDICINE | Admitting: EMERGENCY MEDICINE
Payer: COMMERCIAL

## 2022-03-14 VITALS
DIASTOLIC BLOOD PRESSURE: 81 MMHG | HEART RATE: 66 BPM | TEMPERATURE: 99 F | HEIGHT: 65 IN | SYSTOLIC BLOOD PRESSURE: 137 MMHG | OXYGEN SATURATION: 100 % | RESPIRATION RATE: 18 BRPM | WEIGHT: 154.98 LBS

## 2022-03-14 DIAGNOSIS — Z98.890 OTHER SPECIFIED POSTPROCEDURAL STATES: Chronic | ICD-10-CM

## 2022-03-14 DIAGNOSIS — Z95.1 PRESENCE OF AORTOCORONARY BYPASS GRAFT: ICD-10-CM

## 2022-03-14 DIAGNOSIS — R07.89 OTHER CHEST PAIN: ICD-10-CM

## 2022-03-14 DIAGNOSIS — I25.10 ATHEROSCLEROTIC HEART DISEASE OF NATIVE CORONARY ARTERY WITHOUT ANGINA PECTORIS: ICD-10-CM

## 2022-03-14 DIAGNOSIS — Z79.82 LONG TERM (CURRENT) USE OF ASPIRIN: ICD-10-CM

## 2022-03-14 LAB
ALBUMIN SERPL ELPH-MCNC: 4.8 G/DL — SIGNIFICANT CHANGE UP (ref 3.5–5.2)
ALP SERPL-CCNC: 100 U/L — SIGNIFICANT CHANGE UP (ref 30–115)
ALT FLD-CCNC: 36 U/L — SIGNIFICANT CHANGE UP (ref 0–41)
ANION GAP SERPL CALC-SCNC: 9 MMOL/L — SIGNIFICANT CHANGE UP (ref 7–14)
AST SERPL-CCNC: 31 U/L — SIGNIFICANT CHANGE UP (ref 0–41)
BASOPHILS # BLD AUTO: 0.03 K/UL — SIGNIFICANT CHANGE UP (ref 0–0.2)
BASOPHILS NFR BLD AUTO: 0.4 % — SIGNIFICANT CHANGE UP (ref 0–1)
BILIRUB SERPL-MCNC: 0.3 MG/DL — SIGNIFICANT CHANGE UP (ref 0.2–1.2)
BUN SERPL-MCNC: 34 MG/DL — HIGH (ref 10–20)
CALCIUM SERPL-MCNC: 9.5 MG/DL — SIGNIFICANT CHANGE UP (ref 8.5–10.1)
CHLORIDE SERPL-SCNC: 100 MMOL/L — SIGNIFICANT CHANGE UP (ref 98–110)
CO2 SERPL-SCNC: 29 MMOL/L — SIGNIFICANT CHANGE UP (ref 17–32)
CREAT SERPL-MCNC: 1 MG/DL — SIGNIFICANT CHANGE UP (ref 0.7–1.5)
EGFR: 91 ML/MIN/1.73M2 — SIGNIFICANT CHANGE UP
EOSINOPHIL # BLD AUTO: 0.13 K/UL — SIGNIFICANT CHANGE UP (ref 0–0.7)
EOSINOPHIL NFR BLD AUTO: 1.8 % — SIGNIFICANT CHANGE UP (ref 0–8)
GLUCOSE SERPL-MCNC: 106 MG/DL — HIGH (ref 70–99)
HCT VFR BLD CALC: 45.9 % — SIGNIFICANT CHANGE UP (ref 42–52)
HGB BLD-MCNC: 14.9 G/DL — SIGNIFICANT CHANGE UP (ref 14–18)
IMM GRANULOCYTES NFR BLD AUTO: 0.3 % — SIGNIFICANT CHANGE UP (ref 0.1–0.3)
LYMPHOCYTES # BLD AUTO: 1.49 K/UL — SIGNIFICANT CHANGE UP (ref 1.2–3.4)
LYMPHOCYTES # BLD AUTO: 20.2 % — LOW (ref 20.5–51.1)
MCHC RBC-ENTMCNC: 27.3 PG — SIGNIFICANT CHANGE UP (ref 27–31)
MCHC RBC-ENTMCNC: 32.5 G/DL — SIGNIFICANT CHANGE UP (ref 32–37)
MCV RBC AUTO: 84.2 FL — SIGNIFICANT CHANGE UP (ref 80–94)
MONOCYTES # BLD AUTO: 1.06 K/UL — HIGH (ref 0.1–0.6)
MONOCYTES NFR BLD AUTO: 14.4 % — HIGH (ref 1.7–9.3)
NEUTROPHILS # BLD AUTO: 4.64 K/UL — SIGNIFICANT CHANGE UP (ref 1.4–6.5)
NEUTROPHILS NFR BLD AUTO: 62.9 % — SIGNIFICANT CHANGE UP (ref 42.2–75.2)
NRBC # BLD: 0 /100 WBCS — SIGNIFICANT CHANGE UP (ref 0–0)
PLATELET # BLD AUTO: 146 K/UL — SIGNIFICANT CHANGE UP (ref 130–400)
POTASSIUM SERPL-MCNC: 4 MMOL/L — SIGNIFICANT CHANGE UP (ref 3.5–5)
POTASSIUM SERPL-SCNC: 4 MMOL/L — SIGNIFICANT CHANGE UP (ref 3.5–5)
PROT SERPL-MCNC: 7.2 G/DL — SIGNIFICANT CHANGE UP (ref 6–8)
RBC # BLD: 5.45 M/UL — SIGNIFICANT CHANGE UP (ref 4.7–6.1)
RBC # FLD: 12.3 % — SIGNIFICANT CHANGE UP (ref 11.5–14.5)
SODIUM SERPL-SCNC: 138 MMOL/L — SIGNIFICANT CHANGE UP (ref 135–146)
TROPONIN T SERPL-MCNC: <0.01 NG/ML — SIGNIFICANT CHANGE UP
WBC # BLD: 7.37 K/UL — SIGNIFICANT CHANGE UP (ref 4.8–10.8)
WBC # FLD AUTO: 7.37 K/UL — SIGNIFICANT CHANGE UP (ref 4.8–10.8)

## 2022-03-14 PROCEDURE — 93010 ELECTROCARDIOGRAM REPORT: CPT

## 2022-03-14 PROCEDURE — 99285 EMERGENCY DEPT VISIT HI MDM: CPT

## 2022-03-14 NOTE — ED PROVIDER NOTE - OBJECTIVE STATEMENT
51 year old male with pmhx of CAD s/p CABG on aspirin, presents with left sided chest pain. Pain began earlier today, sharp pain, radiating to left neck. No sob, cough, fever, chills, calf pain or swelling. no recent travel or sick contacts. cardio - dr jolly

## 2022-03-14 NOTE — ED PROVIDER NOTE - ATTENDING CONTRIBUTION TO CARE
pt co left sided cp and neck pain. sore feeling. he thinks its muscular but wanted to get checked out.  PMH CAD, PSH CABG 1.5 years ago. no issues since. no sob, fever, chills.    pt in nad, ctab, rrr, ab soft, nt. nfd.  ekg, labs, EDOU for acs eval.

## 2022-03-14 NOTE — ED ADULT NURSE NOTE - NSELOPED_ED_ALL_ED
Call was placed to patient's given phone number to arrange for patient to  instructions and/or prescription./Patient's chart was referred to charge nurse/supervisor for disposition of prescription and/or discharge instructions./Physician notified

## 2022-03-15 NOTE — ED ADULT NURSE REASSESSMENT NOTE - NS ED NURSE REASSESS COMMENT FT1
pt JANA perez aware  pt had an IV, spoke with wife Lety who confirmed that pt took his IV OUT  pt also stated that IV was out of the patients arm  pt was informed that his evaluation was still inconclusive and pt stated that he will be going to his own cardiologist in the am

## 2022-05-10 ENCOUNTER — APPOINTMENT (OUTPATIENT)
Dept: CARDIOLOGY | Facility: CLINIC | Age: 52
End: 2022-05-10
Payer: COMMERCIAL

## 2022-05-10 PROCEDURE — 93351 STRESS TTE COMPLETE: CPT

## 2022-05-10 PROCEDURE — 93325 DOPPLER ECHO COLOR FLOW MAPG: CPT

## 2022-05-10 PROCEDURE — 93320 DOPPLER ECHO COMPLETE: CPT

## 2022-06-03 ENCOUNTER — APPOINTMENT (OUTPATIENT)
Dept: CARDIOLOGY | Facility: CLINIC | Age: 52
End: 2022-06-03
Payer: COMMERCIAL

## 2022-06-03 VITALS
HEIGHT: 65 IN | HEART RATE: 48 BPM | WEIGHT: 150 LBS | BODY MASS INDEX: 24.99 KG/M2 | DIASTOLIC BLOOD PRESSURE: 84 MMHG | SYSTOLIC BLOOD PRESSURE: 110 MMHG

## 2022-06-03 PROCEDURE — 99214 OFFICE O/P EST MOD 30 MIN: CPT

## 2022-06-03 NOTE — ASSESSMENT
[FreeTextEntry1] : 51 yo male with pmhx and presentation as above\par cad/ami/acbg\par dyslipidemia\par cont meds\par labs reviewed, cont statin, add zetia 10 daily\par repeat stress echo - low risk at high workload\par ok to proceed with sx at mod risk \par aggressive risk modif\par diet and act as tolerated\par rtc 5-6 months\par

## 2022-06-03 NOTE — HISTORY OF PRESENT ILLNESS
[FreeTextEntry1] : 53 yo male with pmhx as below is here for a f/up visit\par 09/20 admitted to [facility] with ami, w/up revealed sign 3v cad, s/p cabg\par on low dose bb, doing well\par NO major cv complains\par Et is stable\par s/p stress echo\par pre op for a back sx\par Ros is otherwise as below

## 2022-06-25 ENCOUNTER — RX RENEWAL (OUTPATIENT)
Age: 52
End: 2022-06-25

## 2022-06-30 ENCOUNTER — NON-APPOINTMENT (OUTPATIENT)
Age: 52
End: 2022-06-30

## 2022-07-22 ENCOUNTER — APPOINTMENT (OUTPATIENT)
Dept: CARDIOLOGY | Facility: CLINIC | Age: 52
End: 2022-07-22

## 2022-07-28 ENCOUNTER — RX RENEWAL (OUTPATIENT)
Age: 52
End: 2022-07-28

## 2022-11-18 ENCOUNTER — APPOINTMENT (OUTPATIENT)
Dept: CARDIOLOGY | Facility: CLINIC | Age: 52
End: 2022-11-18
Payer: COMMERCIAL

## 2022-11-18 VITALS
WEIGHT: 149 LBS | DIASTOLIC BLOOD PRESSURE: 80 MMHG | BODY MASS INDEX: 24.83 KG/M2 | SYSTOLIC BLOOD PRESSURE: 146 MMHG | HEART RATE: 58 BPM | HEIGHT: 65 IN

## 2022-11-18 PROCEDURE — 99213 OFFICE O/P EST LOW 20 MIN: CPT | Mod: 25

## 2022-11-18 PROCEDURE — 93000 ELECTROCARDIOGRAM COMPLETE: CPT

## 2022-11-18 NOTE — HISTORY OF PRESENT ILLNESS
[FreeTextEntry1] : 51 yo male with pmhx as below is here for a f/up visit\par 09/20 admitted to [facility] with ami, w/up revealed sign 3v cad, s/p cabg\par on low dose bb, doing well\par NO major cv complains\par Et is stable\par s/p back sx\par Ros is otherwise as below

## 2022-11-18 NOTE — ASSESSMENT
[FreeTextEntry1] : 53 yo male with pmhx and presentation as above\par cad/ami/acbg\par dyslipidemia\par cont meds\par labs reviewed, lipids much better with zetia\par 5/22 stress echo - low risk at high workload\par aggressive risk modif\par diet and act as tolerated\par rtc 5-6 months\par

## 2022-11-18 NOTE — PHYSICAL EXAM
[Well Developed] : well developed [Well Nourished] : well nourished [No Acute Distress] : no acute distress [Normal Venous Pressure] : normal venous pressure [No Carotid Bruit] : no carotid bruit [Normal S1, S2] : normal S1, S2 [No Murmur] : no murmur [No Rub] : no rub [No Gallop] : no gallop [Clear Lung Fields] : clear lung fields [Good Air Entry] : good air entry [No Respiratory Distress] : no respiratory distress  [Soft] : abdomen soft [Non Tender] : non-tender [No Masses/organomegaly] : no masses/organomegaly [Normal Bowel Sounds] : normal bowel sounds [Normal Gait] : normal gait [No Edema] : no edema [No Cyanosis] : no cyanosis [No Clubbing] : no clubbing [No Varicosities] : no varicosities [No Rash] : no rash [No Skin Lesions] : no skin lesions [Moves all extremities] : moves all extremities [Normal Speech] : normal speech [No Focal Deficits] : no focal deficits [Alert and Oriented] : alert and oriented [Normal memory] : normal memory

## 2022-11-22 NOTE — ED ADULT NURSE NOTE - CHPI ED NUR SYMPTOMS POS
SHORTNESS OF BREATH/tightness in chest Bactrim Counseling:  I discussed with the patient the risks of sulfa antibiotics including but not limited to GI upset, allergic reaction, drug rash, diarrhea, dizziness, photosensitivity, and yeast infections.  Rarely, more serious reactions can occur including but not limited to aplastic anemia, agranulocytosis, methemoglobinemia, blood dyscrasias, liver or kidney failure, lung infiltrates or desquamative/blistering drug rashes.

## 2023-05-30 ENCOUNTER — APPOINTMENT (OUTPATIENT)
Dept: CARDIOLOGY | Facility: CLINIC | Age: 53
End: 2023-05-30
Payer: COMMERCIAL

## 2023-05-30 VITALS
SYSTOLIC BLOOD PRESSURE: 122 MMHG | BODY MASS INDEX: 24.99 KG/M2 | HEART RATE: 61 BPM | HEIGHT: 65 IN | WEIGHT: 150 LBS | DIASTOLIC BLOOD PRESSURE: 80 MMHG

## 2023-05-30 PROCEDURE — 99213 OFFICE O/P EST LOW 20 MIN: CPT | Mod: 25

## 2023-05-30 PROCEDURE — 93000 ELECTROCARDIOGRAM COMPLETE: CPT

## 2023-05-30 NOTE — HISTORY OF PRESENT ILLNESS
[FreeTextEntry1] : 52 yo male with pmhx as below is here for a f/up visit\par 09/20 admitted to [facility] with ami, w/up revealed sign 3v cad, s/p cabg\par on low dose bb, doing well\par NO major cv complains\par Et is stable\par Ros is otherwise as below

## 2023-05-30 NOTE — ASSESSMENT
[FreeTextEntry1] : 52 yo male with pmhx and presentation as above\par cad/ami/acbg\par dyslipidemia\par cont meds\par labs reviewed, lipids at goal\par 5/22 stress echo - low risk at high workload\par aggressive risk modif\par diet and act as tolerated\par rtc 5-6 months\par

## 2023-06-19 ENCOUNTER — RX RENEWAL (OUTPATIENT)
Age: 53
End: 2023-06-19

## 2023-07-24 ENCOUNTER — RX RENEWAL (OUTPATIENT)
Age: 53
End: 2023-07-24

## 2023-07-24 RX ORDER — ROSUVASTATIN CALCIUM 20 MG/1
20 TABLET, FILM COATED ORAL
Qty: 90 | Refills: 3 | Status: ACTIVE | COMMUNITY
Start: 2020-11-02 | End: 1900-01-01

## 2023-07-24 RX ORDER — METOPROLOL SUCCINATE 25 MG/1
25 TABLET, EXTENDED RELEASE ORAL
Qty: 45 | Refills: 3 | Status: ACTIVE | COMMUNITY
Start: 2020-10-16 | End: 1900-01-01

## 2023-08-18 ENCOUNTER — RX RENEWAL (OUTPATIENT)
Age: 53
End: 2023-08-18

## 2023-12-01 ENCOUNTER — APPOINTMENT (OUTPATIENT)
Dept: CARDIOLOGY | Facility: CLINIC | Age: 53
End: 2023-12-01
Payer: COMMERCIAL

## 2023-12-01 VITALS
DIASTOLIC BLOOD PRESSURE: 80 MMHG | WEIGHT: 148 LBS | SYSTOLIC BLOOD PRESSURE: 122 MMHG | HEART RATE: 55 BPM | BODY MASS INDEX: 24.66 KG/M2 | HEIGHT: 65 IN

## 2023-12-01 DIAGNOSIS — R00.2 PALPITATIONS: ICD-10-CM

## 2023-12-01 PROCEDURE — 93000 ELECTROCARDIOGRAM COMPLETE: CPT

## 2023-12-01 PROCEDURE — 99213 OFFICE O/P EST LOW 20 MIN: CPT | Mod: 25

## 2023-12-01 RX ORDER — EZETIMIBE 10 MG/1
10 TABLET ORAL DAILY
Qty: 90 | Refills: 3 | Status: DISCONTINUED | COMMUNITY
Start: 2022-06-03 | End: 2023-12-01

## 2023-12-01 RX ORDER — CEFUROXIME AXETIL 500 MG/1
500 TABLET ORAL
Qty: 20 | Refills: 0 | Status: DISCONTINUED | COMMUNITY
Start: 2023-05-25 | End: 2023-12-01

## 2023-12-01 RX ORDER — EZETIMIBE 10 MG/1
10 TABLET ORAL
Qty: 90 | Refills: 3 | Status: ACTIVE | COMMUNITY
Start: 2023-12-01 | End: 1900-01-01

## 2024-05-10 ENCOUNTER — APPOINTMENT (OUTPATIENT)
Dept: CARDIOLOGY | Facility: CLINIC | Age: 54
End: 2024-05-10

## 2024-06-07 ENCOUNTER — APPOINTMENT (OUTPATIENT)
Dept: CARDIOLOGY | Facility: CLINIC | Age: 54
End: 2024-06-07
Payer: COMMERCIAL

## 2024-06-07 VITALS
DIASTOLIC BLOOD PRESSURE: 70 MMHG | HEART RATE: 47 BPM | HEIGHT: 65 IN | BODY MASS INDEX: 24.49 KG/M2 | SYSTOLIC BLOOD PRESSURE: 112 MMHG | WEIGHT: 147 LBS

## 2024-06-07 DIAGNOSIS — Z95.1 PRESENCE OF AORTOCORONARY BYPASS GRAFT: ICD-10-CM

## 2024-06-07 DIAGNOSIS — I25.10 ATHEROSCLEROTIC HEART DISEASE OF NATIVE CORONARY ARTERY W/OUT ANGINA PECTORIS: ICD-10-CM

## 2024-06-07 DIAGNOSIS — E78.5 HYPERLIPIDEMIA, UNSPECIFIED: ICD-10-CM

## 2024-06-07 PROCEDURE — 93000 ELECTROCARDIOGRAM COMPLETE: CPT

## 2024-06-07 PROCEDURE — 99213 OFFICE O/P EST LOW 20 MIN: CPT | Mod: 25

## 2024-06-07 NOTE — ASSESSMENT
[FreeTextEntry1] : 54 yo male with pmhx and presentation as above cad/ami/acbg dyslipidemia cont meds labs reviewed, lipids at goal 5/22 stress echo - low risk at high workload aggressive risk modif diet and act as tolerated rtc 6 months

## 2024-06-07 NOTE — HISTORY OF PRESENT ILLNESS
[FreeTextEntry1] : 53yo male with pmhx as below is here for a f/up visit 09/20 admitted to [facility] with ami, w/up revealed sign 3v cad, s/p cabg on low dose bb, doing well NO major cv complains Et is stable Ros is otherwise as below

## 2024-06-12 ENCOUNTER — RX RENEWAL (OUTPATIENT)
Age: 54
End: 2024-06-12

## 2024-06-12 RX ORDER — ASPIRIN 81 MG/1
81 TABLET, COATED ORAL
Qty: 90 | Refills: 3 | Status: ACTIVE | COMMUNITY
Start: 2022-06-25 | End: 1900-01-01

## 2024-07-17 ENCOUNTER — RX RENEWAL (OUTPATIENT)
Age: 54
End: 2024-07-17

## 2024-12-08 ENCOUNTER — NON-APPOINTMENT (OUTPATIENT)
Age: 54
End: 2024-12-08

## 2025-01-10 ENCOUNTER — NON-APPOINTMENT (OUTPATIENT)
Age: 55
End: 2025-01-10

## 2025-01-10 ENCOUNTER — APPOINTMENT (OUTPATIENT)
Dept: CARDIOLOGY | Facility: CLINIC | Age: 55
End: 2025-01-10
Payer: COMMERCIAL

## 2025-01-10 VITALS
HEART RATE: 49 BPM | DIASTOLIC BLOOD PRESSURE: 80 MMHG | HEIGHT: 65 IN | BODY MASS INDEX: 24.66 KG/M2 | SYSTOLIC BLOOD PRESSURE: 122 MMHG | WEIGHT: 148 LBS

## 2025-01-10 DIAGNOSIS — Z95.1 PRESENCE OF AORTOCORONARY BYPASS GRAFT: ICD-10-CM

## 2025-01-10 DIAGNOSIS — E78.5 HYPERLIPIDEMIA, UNSPECIFIED: ICD-10-CM

## 2025-01-10 DIAGNOSIS — I25.10 ATHEROSCLEROTIC HEART DISEASE OF NATIVE CORONARY ARTERY W/OUT ANGINA PECTORIS: ICD-10-CM

## 2025-01-10 PROCEDURE — 93000 ELECTROCARDIOGRAM COMPLETE: CPT

## 2025-01-10 PROCEDURE — 99213 OFFICE O/P EST LOW 20 MIN: CPT | Mod: 25

## 2025-01-21 ENCOUNTER — APPOINTMENT (OUTPATIENT)
Dept: CARDIOLOGY | Facility: CLINIC | Age: 55
End: 2025-01-21
Payer: COMMERCIAL

## 2025-01-21 DIAGNOSIS — Z95.1 PRESENCE OF AORTOCORONARY BYPASS GRAFT: ICD-10-CM

## 2025-01-21 DIAGNOSIS — I25.10 ATHEROSCLEROTIC HEART DISEASE OF NATIVE CORONARY ARTERY W/OUT ANGINA PECTORIS: ICD-10-CM

## 2025-01-21 PROCEDURE — 93351 STRESS TTE COMPLETE: CPT

## 2025-01-21 PROCEDURE — 93320 DOPPLER ECHO COMPLETE: CPT

## 2025-01-21 PROCEDURE — 93325 DOPPLER ECHO COLOR FLOW MAPG: CPT

## 2025-05-16 NOTE — ED ADULT NURSE NOTE - HIV OFFER
Reviewed with Dr Chawla and kinza instructed to reach out to Dr Mccain office about  symptoms . Follow up with Lymphedema clinic. That it may be Lymphoedemaand to try a compression bra. She should also call on Monday if not better and may need to prescribe antibiotics, patient verbalized understanding.     Opt out

## 2025-05-20 ENCOUNTER — RX RENEWAL (OUTPATIENT)
Age: 55
End: 2025-05-20

## 2025-06-28 NOTE — HISTORY OF PRESENT ILLNESS
Orientation to room/Bed in low position, brakes on/Side rails x 2 or 4 up, assess large gaps, such that a patient could get extremity or other body part entrapped, use additional safety procedures/Call light is within reach, educate patient/family on its functionality/Assess for adequate lighting, leave nightlight on/Patient and family education available to parents and patient/Document fall prevention teaching and include in plan of care
[FreeTextEntry1] : 50 yo male with pmhx as below is here for a f/up visit\par 09/20 admitted to [facility] with ami, w/up revealed sign 3v cad, s/p cabg\par on low dose bb, doing well\par NO major cv complains\par Et is stable\par + weight gain\par Ros is otherwise as below

## 2025-07-11 ENCOUNTER — APPOINTMENT (OUTPATIENT)
Dept: CARDIOLOGY | Facility: CLINIC | Age: 55
End: 2025-07-11
Payer: COMMERCIAL

## 2025-07-11 ENCOUNTER — RESULT CHARGE (OUTPATIENT)
Age: 55
End: 2025-07-11

## 2025-07-11 VITALS
HEART RATE: 53 BPM | DIASTOLIC BLOOD PRESSURE: 84 MMHG | HEIGHT: 65 IN | BODY MASS INDEX: 23.66 KG/M2 | WEIGHT: 142 LBS | SYSTOLIC BLOOD PRESSURE: 130 MMHG

## 2025-07-11 PROCEDURE — 99214 OFFICE O/P EST MOD 30 MIN: CPT | Mod: 25

## 2025-07-11 PROCEDURE — 93000 ELECTROCARDIOGRAM COMPLETE: CPT

## 2025-07-14 ENCOUNTER — RX RENEWAL (OUTPATIENT)
Age: 55
End: 2025-07-14